# Patient Record
Sex: FEMALE | Race: WHITE | NOT HISPANIC OR LATINO | Employment: OTHER | URBAN - METROPOLITAN AREA
[De-identification: names, ages, dates, MRNs, and addresses within clinical notes are randomized per-mention and may not be internally consistent; named-entity substitution may affect disease eponyms.]

---

## 2023-02-23 LAB
ALBUMIN (G/DL) IN SER/PLAS: 4.1 G/DL (ref 3.4–5)
ANION GAP IN SER/PLAS: 10 MMOL/L (ref 10–20)
CALCIUM (MG/DL) IN SER/PLAS: 9.5 MG/DL (ref 8.6–10.3)
CARBON DIOXIDE, TOTAL (MMOL/L) IN SER/PLAS: 28 MMOL/L (ref 21–32)
CHLORIDE (MMOL/L) IN SER/PLAS: 105 MMOL/L (ref 98–107)
CHOLESTEROL (MG/DL) IN SER/PLAS: 153 MG/DL (ref 0–199)
CHOLESTEROL IN HDL (MG/DL) IN SER/PLAS: 46.1 MG/DL
CHOLESTEROL/HDL RATIO: 3.3
CREATININE (MG/DL) IN SER/PLAS: 0.83 MG/DL (ref 0.5–1.05)
GFR FEMALE: 78 ML/MIN/1.73M2
GLUCOSE (MG/DL) IN SER/PLAS: 98 MG/DL (ref 74–99)
LDL: 89 MG/DL (ref 0–99)
PHOSPHATE (MG/DL) IN SER/PLAS: 2.8 MG/DL (ref 2.5–4.9)
POTASSIUM (MMOL/L) IN SER/PLAS: 4.1 MMOL/L (ref 3.5–5.3)
SODIUM (MMOL/L) IN SER/PLAS: 139 MMOL/L (ref 136–145)
TRIGLYCERIDE (MG/DL) IN SER/PLAS: 88 MG/DL (ref 0–149)
UREA NITROGEN (MG/DL) IN SER/PLAS: 14 MG/DL (ref 6–23)
VLDL: 18 MG/DL (ref 0–40)

## 2023-09-27 PROBLEM — H34.219 HOLLENHORST PLAQUE: Status: ACTIVE | Noted: 2023-09-27

## 2023-09-27 PROBLEM — H40.9 GLAUCOMA: Status: ACTIVE | Noted: 2023-09-27

## 2023-09-27 PROBLEM — H40.243 RESIDUAL STAGE OF ANGLE-CLOSURE GLAUCOMA OF BOTH EYES: Status: ACTIVE | Noted: 2023-09-27

## 2023-09-27 PROBLEM — G43.109 MIGRAINE AURA WITHOUT HEADACHE: Status: ACTIVE | Noted: 2023-09-27

## 2023-09-27 PROBLEM — K21.9 GERD (GASTROESOPHAGEAL REFLUX DISEASE): Status: ACTIVE | Noted: 2023-09-27

## 2023-09-27 PROBLEM — G47.30 SLEEP APNEA: Status: ACTIVE | Noted: 2023-09-27

## 2023-09-27 PROBLEM — H18.519 CORNEA GUTTATA: Status: ACTIVE | Noted: 2023-09-27

## 2023-09-27 PROBLEM — H25.13 NUCLEAR SCLEROTIC CATARACT OF BOTH EYES: Status: ACTIVE | Noted: 2023-09-27

## 2023-09-27 PROBLEM — H26.9 CATARACTS, BILATERAL: Status: ACTIVE | Noted: 2023-09-27

## 2023-09-27 PROBLEM — E78.2 MIXED HYPERLIPIDEMIA: Status: ACTIVE | Noted: 2023-09-27

## 2023-09-27 PROBLEM — N62 SYMPTOMATIC MAMMARY HYPERTROPHY: Status: ACTIVE | Noted: 2023-09-27

## 2023-09-27 PROBLEM — H18.413 ARCUS SENILIS OF BOTH CORNEAS: Status: ACTIVE | Noted: 2023-09-27

## 2023-09-27 PROBLEM — H53.123 TRANSIENT VISUAL LOSS OF BOTH EYES: Status: ACTIVE | Noted: 2023-09-27

## 2023-09-27 PROBLEM — E55.9 VITAMIN D DEFICIENCY: Status: ACTIVE | Noted: 2023-09-27

## 2023-09-27 PROBLEM — D31.30 NEVUS OF CHOROID: Status: ACTIVE | Noted: 2023-09-27

## 2023-09-27 PROBLEM — G45.3 AMAUROSIS FUGAX OF LEFT EYE: Status: ACTIVE | Noted: 2023-09-27

## 2023-09-27 RX ORDER — ASPIRIN 325 MG
1 TABLET ORAL DAILY
COMMUNITY

## 2023-09-27 RX ORDER — CARBOXYMETHYLCELLULOSE SODIUM 5 MG/ML
1 SOLUTION/ DROPS OPHTHALMIC 2 TIMES DAILY PRN
COMMUNITY

## 2023-09-27 RX ORDER — MULTIVITAMIN
1 TABLET ORAL DAILY
COMMUNITY

## 2023-09-27 RX ORDER — OMEPRAZOLE 40 MG/1
1 CAPSULE, DELAYED RELEASE ORAL DAILY
COMMUNITY
Start: 2017-11-01

## 2023-10-05 DIAGNOSIS — Z12.11 COLON CANCER SCREENING: Primary | ICD-10-CM

## 2023-10-05 RX ORDER — POLYETHYLENE GLYCOL 3350, SODIUM SULFATE ANHYDROUS, SODIUM BICARBONATE, SODIUM CHLORIDE, POTASSIUM CHLORIDE 236; 22.74; 6.74; 5.86; 2.97 G/4L; G/4L; G/4L; G/4L; G/4L
4000 POWDER, FOR SOLUTION ORAL ONCE
Qty: 4000 ML | Refills: 0 | Status: SHIPPED | OUTPATIENT
Start: 2023-10-05 | End: 2023-10-06

## 2023-10-12 PROBLEM — R00.2 PALPITATIONS: Status: ACTIVE | Noted: 2023-10-12

## 2023-10-12 PROBLEM — H53.9 VISUAL DISTURBANCE: Status: ACTIVE | Noted: 2023-10-12

## 2023-10-12 PROBLEM — R06.02 SHORTNESS OF BREATH: Status: ACTIVE | Noted: 2023-10-12

## 2023-10-12 PROBLEM — R68.82 DECREASED LIBIDO: Status: ACTIVE | Noted: 2023-10-12

## 2023-10-12 PROBLEM — N95.0 POSTMENOPAUSAL BLEEDING: Status: ACTIVE | Noted: 2023-10-12

## 2023-10-12 PROBLEM — I82.90 THROMBUS: Status: ACTIVE | Noted: 2023-10-12

## 2023-10-12 PROBLEM — R53.83 FATIGUE: Status: ACTIVE | Noted: 2023-10-12

## 2023-10-12 RX ORDER — ROSUVASTATIN CALCIUM 10 MG/1
1 TABLET, COATED ORAL NIGHTLY
COMMUNITY
End: 2023-10-13 | Stop reason: WASHOUT

## 2023-10-12 RX ORDER — ERYTHROMYCIN 250 MG/1
1000 TABLET, COATED ORAL
COMMUNITY

## 2023-10-12 RX ORDER — TOLTERODINE 4 MG/1
4 CAPSULE, EXTENDED RELEASE ORAL DAILY
COMMUNITY
Start: 2022-10-31

## 2023-10-13 ENCOUNTER — LAB (OUTPATIENT)
Dept: LAB | Facility: LAB | Age: 63
End: 2023-10-13
Payer: MEDICARE

## 2023-10-13 ENCOUNTER — OFFICE VISIT (OUTPATIENT)
Dept: PRIMARY CARE | Facility: CLINIC | Age: 63
End: 2023-10-13
Payer: MEDICARE

## 2023-10-13 VITALS
HEIGHT: 67 IN | WEIGHT: 265 LBS | HEART RATE: 75 BPM | TEMPERATURE: 97.6 F | DIASTOLIC BLOOD PRESSURE: 80 MMHG | BODY MASS INDEX: 41.59 KG/M2 | SYSTOLIC BLOOD PRESSURE: 120 MMHG

## 2023-10-13 DIAGNOSIS — Z00.00 WELCOME TO MEDICARE PREVENTIVE VISIT: ICD-10-CM

## 2023-10-13 DIAGNOSIS — E78.2 MIXED HYPERLIPIDEMIA: ICD-10-CM

## 2023-10-13 DIAGNOSIS — E78.2 MIXED HYPERLIPIDEMIA: Primary | ICD-10-CM

## 2023-10-13 DIAGNOSIS — Z12.31 SCREENING MAMMOGRAM FOR BREAST CANCER: ICD-10-CM

## 2023-10-13 DIAGNOSIS — K21.9 GASTROESOPHAGEAL REFLUX DISEASE WITHOUT ESOPHAGITIS: ICD-10-CM

## 2023-10-13 DIAGNOSIS — Z13.6 SCREENING FOR CARDIOVASCULAR CONDITION: ICD-10-CM

## 2023-10-13 PROBLEM — N95.0 POSTMENOPAUSAL BLEEDING: Status: RESOLVED | Noted: 2023-10-12 | Resolved: 2023-10-13

## 2023-10-13 PROBLEM — N62 SYMPTOMATIC MAMMARY HYPERTROPHY: Status: RESOLVED | Noted: 2023-09-27 | Resolved: 2023-10-13

## 2023-10-13 PROBLEM — H53.9 VISUAL DISTURBANCE: Status: RESOLVED | Noted: 2023-10-12 | Resolved: 2023-10-13

## 2023-10-13 PROBLEM — R00.2 PALPITATIONS: Status: RESOLVED | Noted: 2023-10-12 | Resolved: 2023-10-13

## 2023-10-13 PROBLEM — I82.90 THROMBUS: Status: RESOLVED | Noted: 2023-10-12 | Resolved: 2023-10-13

## 2023-10-13 PROBLEM — R53.83 FATIGUE: Status: RESOLVED | Noted: 2023-10-12 | Resolved: 2023-10-13

## 2023-10-13 PROBLEM — R06.02 SHORTNESS OF BREATH: Status: RESOLVED | Noted: 2023-10-12 | Resolved: 2023-10-13

## 2023-10-13 PROBLEM — R68.82 DECREASED LIBIDO: Status: RESOLVED | Noted: 2023-10-12 | Resolved: 2023-10-13

## 2023-10-13 LAB
ALBUMIN SERPL BCP-MCNC: 4.2 G/DL (ref 3.4–5)
ALP SERPL-CCNC: 58 U/L (ref 33–136)
ALT SERPL W P-5'-P-CCNC: 13 U/L (ref 7–45)
ANION GAP SERPL CALC-SCNC: 13 MMOL/L (ref 10–20)
AST SERPL W P-5'-P-CCNC: 16 U/L (ref 9–39)
BILIRUB SERPL-MCNC: 0.8 MG/DL (ref 0–1.2)
BUN SERPL-MCNC: 13 MG/DL (ref 6–23)
CALCIUM SERPL-MCNC: 9.5 MG/DL (ref 8.6–10.3)
CHLORIDE SERPL-SCNC: 105 MMOL/L (ref 98–107)
CHOLEST SERPL-MCNC: 197 MG/DL (ref 0–199)
CHOLESTEROL/HDL RATIO: 3.7
CO2 SERPL-SCNC: 27 MMOL/L (ref 21–32)
CREAT SERPL-MCNC: 0.89 MG/DL (ref 0.5–1.05)
ERYTHROCYTE [DISTWIDTH] IN BLOOD BY AUTOMATED COUNT: 13.2 % (ref 11.5–14.5)
GFR SERPL CREATININE-BSD FRML MDRD: 72 ML/MIN/1.73M*2
GLUCOSE SERPL-MCNC: 98 MG/DL (ref 74–99)
HCT VFR BLD AUTO: 44.8 % (ref 36–46)
HDLC SERPL-MCNC: 53.8 MG/DL
HGB BLD-MCNC: 14.5 G/DL (ref 12–16)
LDLC SERPL CALC-MCNC: 121 MG/DL
MCH RBC QN AUTO: 29.5 PG (ref 26–34)
MCHC RBC AUTO-ENTMCNC: 32.4 G/DL (ref 32–36)
MCV RBC AUTO: 91 FL (ref 80–100)
NON HDL CHOLESTEROL: 143 MG/DL (ref 0–149)
NRBC BLD-RTO: 0 /100 WBCS (ref 0–0)
PLATELET # BLD AUTO: 277 X10*3/UL (ref 150–450)
PMV BLD AUTO: 10.1 FL (ref 7.5–11.5)
POTASSIUM SERPL-SCNC: 4.5 MMOL/L (ref 3.5–5.3)
PROT SERPL-MCNC: 7.1 G/DL (ref 6.4–8.2)
RBC # BLD AUTO: 4.91 X10*6/UL (ref 4–5.2)
SODIUM SERPL-SCNC: 140 MMOL/L (ref 136–145)
TRIGL SERPL-MCNC: 109 MG/DL (ref 0–149)
TSH SERPL-ACNC: 1.62 MIU/L (ref 0.44–3.98)
VLDL: 22 MG/DL (ref 0–40)
WBC # BLD AUTO: 5.2 X10*3/UL (ref 4.4–11.3)

## 2023-10-13 PROCEDURE — 85027 COMPLETE CBC AUTOMATED: CPT

## 2023-10-13 PROCEDURE — 1170F FXNL STATUS ASSESSED: CPT | Performed by: INTERNAL MEDICINE

## 2023-10-13 PROCEDURE — 80061 LIPID PANEL: CPT

## 2023-10-13 PROCEDURE — 1160F RVW MEDS BY RX/DR IN RCRD: CPT | Performed by: INTERNAL MEDICINE

## 2023-10-13 PROCEDURE — 84443 ASSAY THYROID STIM HORMONE: CPT

## 2023-10-13 PROCEDURE — 36415 COLL VENOUS BLD VENIPUNCTURE: CPT

## 2023-10-13 PROCEDURE — 1159F MED LIST DOCD IN RCRD: CPT | Performed by: INTERNAL MEDICINE

## 2023-10-13 PROCEDURE — G0402 INITIAL PREVENTIVE EXAM: HCPCS | Performed by: INTERNAL MEDICINE

## 2023-10-13 PROCEDURE — 93000 ELECTROCARDIOGRAM COMPLETE: CPT | Performed by: INTERNAL MEDICINE

## 2023-10-13 PROCEDURE — 1036F TOBACCO NON-USER: CPT | Performed by: INTERNAL MEDICINE

## 2023-10-13 PROCEDURE — 1125F AMNT PAIN NOTED PAIN PRSNT: CPT | Performed by: INTERNAL MEDICINE

## 2023-10-13 PROCEDURE — 80053 COMPREHEN METABOLIC PANEL: CPT

## 2023-10-13 ASSESSMENT — PATIENT HEALTH QUESTIONNAIRE - PHQ9
2. FEELING DOWN, DEPRESSED OR HOPELESS: NOT AT ALL
SUM OF ALL RESPONSES TO PHQ9 QUESTIONS 1 AND 2: 0
1. LITTLE INTEREST OR PLEASURE IN DOING THINGS: NOT AT ALL

## 2023-10-13 ASSESSMENT — ACTIVITIES OF DAILY LIVING (ADL)
BATHING: INDEPENDENT
MANAGING_FINANCES: INDEPENDENT
DRESSING: INDEPENDENT
TAKING_MEDICATION: INDEPENDENT
GROCERY_SHOPPING: INDEPENDENT
DOING_HOUSEWORK: INDEPENDENT

## 2023-10-13 ASSESSMENT — ENCOUNTER SYMPTOMS
OCCASIONAL FEELINGS OF UNSTEADINESS: 0
LOSS OF SENSATION IN FEET: 0
DEPRESSION: 0

## 2023-10-13 ASSESSMENT — VISUAL ACUITY
OS_CC: 20/25
OD_CC: 20/30

## 2023-10-13 NOTE — PROGRESS NOTES
"Subjective   Reason for Visit: Tami Faustin is an 63 y.o. female here for a Medicare Wellness visit.          Reviewed all medications by prescribing practitioner or clinical pharmacist (such as prescriptions, OTCs, herbal therapies and supplements) and documented in the medical record.    HPI  63-year-old female with a past medical history of hyperlipidemia GERD here for welcome to Medicare physical she is scheduled to have mammogram done  Patient Care Team:  Shady Workman MD as PCP - General (Internal Medicine)     Review of Systems  REVIEW OF SYSTEMS:  General:  Denies significant weight changes, fever, chills or weakness.  SKIN: Denies any rash or change in moles.  HEENT:  No vision or hearing changes. No headache. No vertigo, No tinnitus.   GI:  No loss of appetite. No change in bowel habit. No abdominal pain. No blood in stool.  GUR: No dysuria. No hematoma, No fever. No incontinence.  Respiratory:  No cough or shortness of breath.  CNS:  No memory or mood changes. No gait disturbance. No focal weakness. No tremors. No tingling or number of extremities.   ENDO: No cold intolerance. No fatigue.    Objective   Vitals:  /80 (BP Location: Left arm, Patient Position: Sitting, BP Cuff Size: Large adult)   Pulse 75   Temp 36.4 °C (97.6 °F) (Temporal)   Ht 1.708 m (5' 7.25\")   Wt 120 kg (265 lb)   BMI 41.20 kg/m²       Physical Exam  PHYSICAL EXAM LONG:  Vitals:  Per [employer].  General Appearance:  Normal-built, well-nourished  with no apparent distress.  Skin:  Normal turgor.  No rash.  Head:  Normocephalic, atraumatic.  Eyes:  Pupils are equal, round, and reactive to light and accommodation.  Extraocular movements are intact.  No pallor of conjunctivae.  Mouth has moist oral mucosa.  Pharynx appears normal.  No erythema.  Nose:  Nasal mucosa normal.  Turbinates are within normal limits.  Ears:  Bilateral auditory ear canals are normal.  Bilateral tympanic membranes are normal and " visible.  Neck:  Supple.  No JVD.  No carotid bruit.  No thyromegaly. No cervical lymphadenopathy.   Chest:  Bilaterally good air entry and bilaterally clear to auscultation.  No wheezing.  No crackles.  Heart:  Regular rate and rhythm.  S1, S2 positive.  No murmur.  Abdomen:  Soft and nontender.  Bowel sounds are positive.  No organomegaly.  Extremities:  Bilaterally no pedal pitting edema.  Bilaterally 2+ dorsalis pedis pulses.  Neuro Exam:  Cranial nerves from II to XII intact.  No facial droop.  Tongue at midline.  Facial sensation intact to light touch and pain sensation.  Motor strength 5/5 in upper and lower extremities.  Sensation is grossly intact to light touch and pain sensation.  Deep tendon reflexes are bilaterally symmetric in upper and lower extremities and within normal limits, 2+.  No cerebellar signs.  Finger-to-nose intact.        Assessment/Plan   Problem List Items Addressed This Visit       Mixed hyperlipidemia - Primary    Relevant Orders    Comprehensive Metabolic Panel    Lipid Panel    TSH with reflex to Free T4 if abnormal    GERD (gastroesophageal reflux disease)    Relevant Orders    CBC     Other Visit Diagnoses       Screening mammogram for breast cancer        Welcome to Medicare preventive visit        Relevant Orders    ECG 12 lead (Clinic Performed)    Eye exam    Vascular US abdominal aorta anuerysm AAA screening    Screening for cardiovascular condition        Relevant Orders    ECG 12 lead    Routine general medical examination at health care facility            Welcome to Medicare physical EKG reviewed within normal limit ordered aortic ultrasound waiting for flu shot she already had a pneumonia shot strongly advised to make living will eye exam is normal    Hyperemia stable advised DASH diet lifestyle modification diet exercise lose weight gave complete blood work to work I will call her with results I will see her back in 3 months

## 2023-10-16 ENCOUNTER — ANESTHESIA (OUTPATIENT)
Dept: GASTROENTEROLOGY | Facility: HOSPITAL | Age: 63
End: 2023-10-16
Payer: MEDICARE

## 2023-10-16 ENCOUNTER — ANESTHESIA EVENT (OUTPATIENT)
Dept: GASTROENTEROLOGY | Facility: HOSPITAL | Age: 63
End: 2023-10-16
Payer: MEDICARE

## 2023-10-16 ENCOUNTER — HOSPITAL ENCOUNTER (OUTPATIENT)
Dept: GASTROENTEROLOGY | Facility: HOSPITAL | Age: 63
Discharge: HOME | End: 2023-10-16
Payer: MEDICARE

## 2023-10-16 VITALS
OXYGEN SATURATION: 96 % | WEIGHT: 260.36 LBS | DIASTOLIC BLOOD PRESSURE: 79 MMHG | TEMPERATURE: 97.3 F | HEART RATE: 62 BPM | RESPIRATION RATE: 16 BRPM | SYSTOLIC BLOOD PRESSURE: 119 MMHG | HEIGHT: 67 IN | BODY MASS INDEX: 40.87 KG/M2

## 2023-10-16 DIAGNOSIS — Z12.11 ENCOUNTER FOR SCREENING FOR MALIGNANT NEOPLASM OF COLON: ICD-10-CM

## 2023-10-16 PROBLEM — E66.813 CLASS 3 SEVERE OBESITY IN ADULT: Status: ACTIVE | Noted: 2023-10-16

## 2023-10-16 PROBLEM — E66.01 CLASS 3 SEVERE OBESITY IN ADULT (MULTI): Status: ACTIVE | Noted: 2023-10-16

## 2023-10-16 PROCEDURE — 2580000001 HC RX 258 IV SOLUTIONS: Performed by: INTERNAL MEDICINE

## 2023-10-16 PROCEDURE — 7100000010 HC PHASE TWO TIME - EACH INCREMENTAL 1 MINUTE

## 2023-10-16 PROCEDURE — 3700000002 HC GENERAL ANESTHESIA TIME - EACH INCREMENTAL 1 MINUTE

## 2023-10-16 PROCEDURE — 2500000004 HC RX 250 GENERAL PHARMACY W/ HCPCS (ALT 636 FOR OP/ED): Performed by: NURSE ANESTHETIST, CERTIFIED REGISTERED

## 2023-10-16 PROCEDURE — 3700000001 HC GENERAL ANESTHESIA TIME - INITIAL BASE CHARGE

## 2023-10-16 PROCEDURE — 7100000009 HC PHASE TWO TIME - INITIAL BASE CHARGE

## 2023-10-16 PROCEDURE — 2500000001 HC RX 250 WO HCPCS SELF ADMINISTERED DRUGS (ALT 637 FOR MEDICARE OP): Performed by: INTERNAL MEDICINE

## 2023-10-16 PROCEDURE — 45385 COLONOSCOPY W/LESION REMOVAL: CPT | Performed by: INTERNAL MEDICINE

## 2023-10-16 RX ORDER — PROPOFOL 10 MG/ML
INJECTION, EMULSION INTRAVENOUS AS NEEDED
Status: DISCONTINUED | OUTPATIENT
Start: 2023-10-16 | End: 2023-10-16

## 2023-10-16 RX ORDER — DEXTROMETHORPHAN/PSEUDOEPHED 2.5-7.5/.8
DROPS ORAL AS NEEDED
Status: COMPLETED | OUTPATIENT
Start: 2023-10-16 | End: 2023-10-16

## 2023-10-16 RX ORDER — ONDANSETRON HYDROCHLORIDE 2 MG/ML
INJECTION, SOLUTION INTRAVENOUS AS NEEDED
Status: DISCONTINUED | OUTPATIENT
Start: 2023-10-16 | End: 2023-10-16

## 2023-10-16 RX ORDER — SODIUM CHLORIDE, SODIUM LACTATE, POTASSIUM CHLORIDE, CALCIUM CHLORIDE 600; 310; 30; 20 MG/100ML; MG/100ML; MG/100ML; MG/100ML
20 INJECTION, SOLUTION INTRAVENOUS CONTINUOUS
Status: DISCONTINUED | OUTPATIENT
Start: 2023-10-16 | End: 2023-10-20 | Stop reason: HOSPADM

## 2023-10-16 RX ADMIN — SIMETHICONE 40 MG: 20 EMULSION ORAL at 14:15

## 2023-10-16 RX ADMIN — PROPOFOL 200 MG: 10 INJECTION, EMULSION INTRAVENOUS at 14:08

## 2023-10-16 RX ADMIN — ONDANSETRON 4 MG: 2 INJECTION INTRAMUSCULAR; INTRAVENOUS at 14:08

## 2023-10-16 RX ADMIN — PROPOFOL 200 MG: 10 INJECTION, EMULSION INTRAVENOUS at 14:24

## 2023-10-16 RX ADMIN — PROPOFOL 200 MG: 10 INJECTION, EMULSION INTRAVENOUS at 14:17

## 2023-10-16 RX ADMIN — SODIUM CHLORIDE, POTASSIUM CHLORIDE, SODIUM LACTATE AND CALCIUM CHLORIDE 20 ML/HR: 600; 310; 30; 20 INJECTION, SOLUTION INTRAVENOUS at 12:57

## 2023-10-16 ASSESSMENT — PAIN SCALES - GENERAL
PAINLEVEL_OUTOF10: 0 - NO PAIN
PAIN_LEVEL: 0
PAINLEVEL_OUTOF10: 0 - NO PAIN
PAINLEVEL_OUTOF10: 0 - NO PAIN

## 2023-10-16 ASSESSMENT — COLUMBIA-SUICIDE SEVERITY RATING SCALE - C-SSRS
6. HAVE YOU EVER DONE ANYTHING, STARTED TO DO ANYTHING, OR PREPARED TO DO ANYTHING TO END YOUR LIFE?: NO
1. IN THE PAST MONTH, HAVE YOU WISHED YOU WERE DEAD OR WISHED YOU COULD GO TO SLEEP AND NOT WAKE UP?: NO
2. HAVE YOU ACTUALLY HAD ANY THOUGHTS OF KILLING YOURSELF?: NO

## 2023-10-16 ASSESSMENT — PAIN - FUNCTIONAL ASSESSMENT
PAIN_FUNCTIONAL_ASSESSMENT: 0-10

## 2023-10-16 NOTE — ADDENDUM NOTE
Addendum  created 10/16/23 1458 by Himanshu Murdock MD    Review and Sign - Ready for Procedure

## 2023-10-16 NOTE — NURSING NOTE
Huddle and Timeout completed together with team. Patient wristband and GWYN information verified.

## 2023-10-16 NOTE — PRE-SEDATION DOCUMENTATION
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    Corneal guttata    GERD (gastroesophageal reflux disease)     Migraine headache     Seasonal allergies     Unspecified lump in unspecified breast     Breast nodule    Wears glasses        Principle problems:  Patient Active Problem List    Diagnosis Date Noted    Class 3 severe obesity in adult (CMS/[facility]) 10/16/2023    Medicare annual wellness visit, initial 10/13/2023    Screening mammogram for breast cancer 10/13/2023    Welcome to Medicare preventive visit 10/13/2023    Screening for cardiovascular condition 10/13/2023    Amaurosis fugax of left eye 09/27/2023    Cataracts, bilateral 09/27/2023    Arcus senilis of both corneas 09/27/2023    Glaucoma 09/27/2023    Hollenhorst plaque 09/27/2023    Migraine aura without headache 09/27/2023    Mixed hyperlipidemia 09/27/2023    Nevus of choroid 09/27/2023    Nuclear sclerotic cataract of both eyes 09/27/2023    Sleep apnea 09/27/2023    Transient visual loss of both eyes 09/27/2023    Vitamin D deficiency 09/27/2023    Cornea guttata 09/27/2023    GERD (gastroesophageal reflux disease) 09/27/2023    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      carboxymethylcellulose (Refresh Plus) 0.5 % ophthalmic solution Administer 1 drop  into both eyes 2 times a day as needed for dry eyes.      erythromycin base (E-Mycin) 250 mg tablet Take 4 tablets (1,000 mg) by mouth.  TAKE 4 TABLETS 1 HOUR PRIOR TO APPOINTMENT      multivitamin (Daily Multi-Vitamin) tablet Take 1 tablet by mouth once daily.      omeprazole (PriLOSEC) 40 mg DR capsule Take 1 tablet by mouth once daily.      peg 400-propylene glycol (SYSTANE) 0.4-0.3 % drops ophthalmic drops Administer 2 drops into both eyes 2 times a day as needed.      tolterodine LA (Detrol LA) 4 mg 24 hr capsule Take 1 capsule (4 mg) by mouth once daily.       No current facility-administered medications for this visit.     Past Surgical History:   has a past surgical history that includes Total knee arthroplasty (Bilateral); Colonoscopy; Breast lumpectomy; Plantar fascia release (Right); [place] tooth extraction; and Glaucoma surgery.    Recent sedation/surgery (24 hours) No    Review of Systems:  Please check all that apply: No significant medical history    Pregnancy test completed prior to procedure on any menstruating female: none        NPO guidelines met: Yes    Physical Exam    Airway  Mallampati: II     Cardiovascular   Rhythm: regular  Rate: normal     Dental    Pulmonary - normal exam  Breath sounds clear to auscultation         Plan    ASA 3     Moderate

## 2023-10-16 NOTE — ANESTHESIA POSTPROCEDURE EVALUATION
Patient: Tami Faustin    Procedure Summary       Date: 10/16/23 Room / Location: [facility]    Anesthesia Start: 1359 Anesthesia Stop: 1429    Procedure: COLONOSCOPY Diagnosis: Encounter for screening for malignant neoplasm of colon    Scheduled Providers: Martina Monet MD; Himanshu Murdock MD Responsible Provider: Himanshu Murdock MD    Anesthesia Type: MAC ASA Status: 3            Anesthesia Type: MAC    Vitals Value Taken Time   BP 98/61 10/16/23 1430   Temp 36.4 10/16/23 1430   Pulse 68 10/16/23 1430   Resp 18 10/16/23 1430   SpO2 99 10/16/23 1430       Anesthesia Post Evaluation    Patient location during evaluation: PACU  Patient participation: complete - patient participated  Level of consciousness: awake  Pain score: 0  Pain management: adequate  Airway patency: patent  Cardiovascular status: acceptable and stable  Respiratory status: acceptable and room air  Hydration status: stable      No notable events documented.

## 2023-10-16 NOTE — DISCHARGE INSTRUCTIONS
Repeat screening colonoscopy in 10 years   OTC anti hemorrhoid suppository 2 times daily     Patient Instructions after a Colonoscopy      The anesthetics, sedatives or narcotics which were given to you today will be acting in your body for the next 24 hours, so you might feel a little sleepy or groggy.  This feeling should slowly wear off. Carefully read and follow the instructions.     You received sedation today:  - Do not drive or operate any machinery or power tools of any kind.   - No alcoholic beverages today, not even beer or wine.  - Do not make any important decisions or sign any legal documents.  - No over the counter medications that contain alcohol or that may cause drowsiness.  - Do not make any important decisions or sign any legal documents.    While it is common to experience mild to moderate abdominal distention, gas, or belching after your procedure, if any of these symptoms occur following discharge from the GI Lab or within one week of having your procedure, call the [facility] to be advised whether a visit to your nearest Urgent Care or Emergency Department is indicated.  Take this paper with you if you go.     - If you develop an allergic reaction to the medications that were given during your procedure such as difficulty breathing, rash, hives, severe nausea, vomiting or lightheadedness.  - If you experience chest pain, shortness of breath, severe abdominal pain, fevers and chills.  -If you develop signs and symptoms of bleeding such as blood in your spit, if your stools turn black, tarry, or bloody  - If you have not urinated within 8 hours following your procedure.  - If your IV site becomes painful, red, inflamed, or looks infected.    If you received a biopsy/polypectomy/sphincterotomy the following instructions apply below:    __ Do not use Aspirin containing products, non-steroidal medications or anti-coagulants for one week following your procedure. (Examples of these  types of medications are: Advil, Arthrotec, Aleve, Coumadin, Ecotrin, Heparin, Ibuprofen, Indocin, Motrin, Naprosyn, Nuprin, Plavix, Vioxx, and Voltarin, or their generic forms.  This list is not all-inclusive.  Check with your physician or pharmacist before resuming medications.)   __ Eat a soft diet today.  Avoid foods that are poorly digested for the next 24 hours.  These foods would include: nuts, beans, lettuce, red meats, and fried foods. Start with liquids and advance your diet as tolerated, gradually work up to eating solids.   __ Do not have a Barium Study or Enema for one week.    Your physician recommends the additional following instructions:    -You have a contact number available for emergencies. The signs and symptoms of potential delayed complications were discussed with you. You may return to normal activities tomorrow.  -Resume your previous diet.  -Continue your present medications.   -We are waiting for your pathology results.  -Your physician has recommended a repeat colonoscopy (date to be determined after pending pathology results are reviewed) for surveillance based on pathology results.  -The findings and recommendations have been discussed with you.  -The findings and recommendations were discussed with your family.  - Please see Medication Reconciliation Form for new medication/medications prescribed.       If you experience any problems or have any questions following discharge from the GI Lab, please call:  Before 5p.m.  [phone]  After 5p.m.    [phone]    Nurse Signature                                                                        Date___________________                                                                            Patient/Responsible Party Signature                                        Date___________________

## 2023-10-16 NOTE — NURSING NOTE
Patient tolerated procedure well. Appears comfortable with no complaints of pain. VS stable. Arousable prior to transport. Patient transported to [facility] via cart.  Report called per CRNA.  Handoff completed.

## 2023-10-16 NOTE — ANESTHESIA PREPROCEDURE EVALUATION
Tami Faustin is a 63 y.o. female here for:    Colonoscopy  With Martina Monet MD  Encounter for screening for malignant neoplasm of colon    Relevant Problems   Anesthesia (within normal limits)      Cardiovascular   (+) Mixed hyperlipidemia      Endocrine   (+) Class 3 severe obesity in adult (CMS/HCC)      GI   (+) GERD (gastroesophageal reflux disease)      /Renal (within normal limits)      Neuro/Psych (within normal limits)      Pulmonary (within normal limits)      Eyes, Ears, Nose, and Throat   (+) Glaucoma   (+) 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       Lab Results   Component Value Date    HGB 14.5 10/13/2023    HCT 44.8 10/13/2023    WBC 5.2 10/13/2023     10/13/2023     10/13/2023    K 4.5 10/13/2023     10/13/2023    CREATININE 0.89 10/13/2023    BUN 13 10/13/2023     EKG 10/2023:  IMPRESSION:  Twelve-lead EKG , normal sinus rhythm no acute changes noted    NM Stress 2022:  IMPRESSION:  Normal Lexiscan Myoview cardiac perfusion stress test.  No evidence of ischemia or myocardial infarction by perfusion imaging.  Normal left ventricular systolic function, ejection fraction 72%.  Abnormal resting electrocardiogram is noted.  No comparison study was available.  Clinical correlation is advised.    Social History     Tobacco Use   Smoking Status Never   Smokeless Tobacco Never       Allergies   Allergen Reactions    Atorvastatin Drowsiness    Bee Pollen Headache    Bee Venom Protein (Honey Bee) Swelling    Penicillins Hives       Current Outpatient Medications   Medication Instructions    aspirin 325 mg tablet 1 tablet, oral, Daily    carboxymethylcellulose (Refresh Plus) 0.5 % ophthalmic solution 1 drop, Both Eyes, 2 times daily PRN    erythromycin base (E-MYCIN) 1,000 mg, oral,  TAKE 4 TABLETS 1 HOUR PRIOR TO APPOINTMENT    multivitamin (Daily Multi-Vitamin) tablet 1 tablet, oral, Daily    omeprazole (PriLOSEC) 40 mg DR capsule 1 tablet, oral, Daily    peg  400-propylene glycol (SYSTANE) 0.4-0.3 % drops ophthalmic drops 2 drops, Both Eyes, 2 times daily PRN    tolterodine LA (DETROL LA) 4 mg, oral, Daily       Past Surgical History:   Procedure Laterality Date    BREAST LUMPECTOMY      COLONOSCOPY      GLAUCOMA SURGERY      PLANTAR FASCIA RELEASE Right     TOTAL KNEE ARTHROPLASTY Bilateral     WISDOM TOOTH EXTRACTION         Family History   Problem Relation Name Age of Onset    Hypertension Mother      Diabetes Mother      COPD Mother      Heart failure Mother      Hypertension Father      COPD Father      Emphysema Father      Cataracts Father      Diabetes Maternal Grandmother      Macular degeneration Maternal Grandmother         NPO Details:  No data recorded    Physical Exam    Anesthesia Plan    ASA 3     MAC     intravenous induction   Anesthetic plan and risks discussed with patient.    Plan discussed with CRNA.

## 2023-10-24 ENCOUNTER — TELEPHONE (OUTPATIENT)
Dept: RHEUMATOLOGY | Facility: CLINIC | Age: 63
End: 2023-10-24
Payer: MEDICARE

## 2023-10-24 NOTE — TELEPHONE ENCOUNTER
Pt called.    She would like to re-establish with you.  Per pt - her arthritic joints aching big time going up down stairs  Since last seeing you she has had partial L knee replacement -  R full knee surgery 2019    Would you accept her again?    I did advise scheduling in march 2024.    Pt [phone]

## 2023-11-09 ENCOUNTER — OFFICE VISIT (OUTPATIENT)
Dept: CARDIOLOGY | Facility: CLINIC | Age: 63
End: 2023-11-09
Payer: MEDICARE

## 2023-11-09 VITALS
HEART RATE: 68 BPM | WEIGHT: 268.5 LBS | HEIGHT: 69 IN | BODY MASS INDEX: 39.77 KG/M2 | SYSTOLIC BLOOD PRESSURE: 118 MMHG | DIASTOLIC BLOOD PRESSURE: 82 MMHG

## 2023-11-09 DIAGNOSIS — E78.2 MIXED HYPERLIPIDEMIA: ICD-10-CM

## 2023-11-09 DIAGNOSIS — Z13.6 SCREENING FOR CARDIOVASCULAR CONDITION: ICD-10-CM

## 2023-11-09 DIAGNOSIS — Z78.9 NEVER SMOKED CIGARETTES: ICD-10-CM

## 2023-11-09 PROCEDURE — 1036F TOBACCO NON-USER: CPT | Performed by: INTERNAL MEDICINE

## 2023-11-09 PROCEDURE — 1160F RVW MEDS BY RX/DR IN RCRD: CPT | Performed by: INTERNAL MEDICINE

## 2023-11-09 PROCEDURE — 3008F BODY MASS INDEX DOCD: CPT | Performed by: INTERNAL MEDICINE

## 2023-11-09 PROCEDURE — 99214 OFFICE O/P EST MOD 30 MIN: CPT | Performed by: INTERNAL MEDICINE

## 2023-11-09 PROCEDURE — 1159F MED LIST DOCD IN RCRD: CPT | Performed by: INTERNAL MEDICINE

## 2023-11-09 PROCEDURE — 1125F AMNT PAIN NOTED PAIN PRSNT: CPT | Performed by: INTERNAL MEDICINE

## 2023-11-09 NOTE — PATIENT INSTRUCTIONS
Continue same medications/treatment.  Patient educated on proper medication use.  Patient educated on risk factor modification.  Please bring any lab results from other providers/physicians to your next appointment.    Please bring all medicines, vitamins, and herbal supplements with you when you come to the office.    Prescriptions will not be filled unless you are compliant with your follow up appointments or have a follow up appointment scheduled as per instruction of your physician. Refills should be requested at the time of your visit.    Follow up in 1 year     I, KATHIE BOB RN, AM SCRIBING FOR AND IN THE PRESENCE OF DR. REMY SAUCEDO MD, FACC

## 2023-11-09 NOTE — PROGRESS NOTES
Referred by Dr. Connell ref. provider found provider found for   Chief Complaint   Patient presents with    Follow-up     9 month        History of Present Illness  Tami Faustin is a 63 y.o. year old female patient doing well from a cardiac standpoint no complaint no symptoms of chest pain or shortness of breath.  She denies any symptoms of syncope or presyncope.  She does have symptoms of fatigue and tiredness.  She was tested for sleep apnea last year but had mild degree.  I discussed with the patient at length we will continue medication her LDL is 121 off statins since she could not tolerate it.  She stated that she has not been compliant with her diet and exercise.  I will repeat cholesterol level in 3 months.  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    Corneal guttata    GERD (gastroesophageal reflux disease)     Migraine headache     Seasonal allergies     Unspecified lump in unspecified breast     Breast nodule    Wears glasses        Social History  Social History     Tobacco Use    Smoking status: Never    Smokeless tobacco: Never   Vaping Use    Vaping Use: Never used   Substance Use Topics    Alcohol use: Yes     Comment: occasionally, 1x weekly    Drug use: Never       Family History     Family History   Problem Relation Name Age of Onset    Hypertension Mother      Diabetes Mother      COPD Mother      Heart failure Mother      Hypertension Father      COPD Father      Emphysema Father      Cataracts Father      Diabetes Maternal Grandmother      Macular degeneration Maternal Grandmother         Review of Systems  As per HPI, all other systems reviewed and negative.    Allergies:  Allergies   Allergen Reactions    Atorvastatin Drowsiness    Bee Pollen Headache    Bee Venom Protein (Honey Bee) Swelling    Penicillins Hives         Outpatient Medications:  Current Outpatient Medications   Medication Instructions    aspirin 325 mg tablet 1 tablet, oral, Daily    carboxymethylcellulose (Refresh Plus) 0.5 % ophthalmic solution 1 drop, Both Eyes, 2 times daily PRN    erythromycin base (E-MYCIN) 1,000 mg, oral,  TAKE 4 TABLETS 1 HOUR PRIOR TO APPOINTMENT    multivitamin (Daily Multi-Vitamin) tablet 1 tablet, oral, Daily    omeprazole (PriLOSEC) 40 mg DR capsule 1 tablet, oral, Daily    tolterodine LA (DETROL LA) 4 mg, oral, Daily, PRN         Vitals:  Vitals:    11/09/23 0912   BP: 118/82   Pulse: 68       Physical Exam:    Constitutional:       Appearance: Healthy appearance. Not in distress.   Neck:      Vascular: No JVR. JVD normal.   Pulmonary:      Effort: Pulmonary effort is normal.      Breath sounds: Normal breath sounds. No wheezing. No rhonchi. No rales.   Chest:      Chest wall: Not tender to palpatation.   Cardiovascular:      PMI at left midclavicular line. Normal rate. Regular rhythm. Normal S1. Normal S2.       Murmurs: There is no murmur.      No gallop.  No click. No rub.   Pulses:     Intact distal pulses.   Edema:     Peripheral edema absent.   Abdominal:      General: Bowel sounds are normal.      Palpations: Abdomen is soft.      Tenderness: There is no abdominal tenderness.   Musculoskeletal: Normal range of motion.         General: No tenderness. Skin:     General: Skin is warm and dry.   Neurological:      General: No focal deficit present.      Mental Status: Alert and oriented to person, place and time.             Assessment/Plan           Steffanie Trevizo MD [facility]  Interventional Cardiology   of [facility]     Thank you for allowing me to participate in the care of this patient. Please do not hesitate to contact me with any further questions or concerns.

## 2023-11-14 ENCOUNTER — TELEPHONE (OUTPATIENT)
Dept: PRIMARY CARE | Facility: CLINIC | Age: 63
End: 2023-11-14
Payer: MEDICARE

## 2023-11-14 DIAGNOSIS — M81.0 OSTEOPOROSIS, UNSPECIFIED OSTEOPOROSIS TYPE, UNSPECIFIED PATHOLOGICAL FRACTURE PRESENCE: ICD-10-CM

## 2023-11-15 ENCOUNTER — HOSPITAL ENCOUNTER (OUTPATIENT)
Dept: RADIOLOGY | Facility: HOSPITAL | Age: 63
Discharge: HOME | End: 2023-11-15
Payer: MEDICARE

## 2023-11-15 DIAGNOSIS — Z12.39 ENCOUNTER FOR OTHER SCREENING FOR MALIGNANT NEOPLASM OF BREAST: ICD-10-CM

## 2023-11-15 PROCEDURE — 77067 SCR MAMMO BI INCL CAD: CPT | Mod: BILATERAL PROCEDURE | Performed by: RADIOLOGY

## 2023-11-15 PROCEDURE — 77067 SCR MAMMO BI INCL CAD: CPT

## 2023-11-15 PROCEDURE — 77063 BREAST TOMOSYNTHESIS BI: CPT | Mod: BILATERAL PROCEDURE | Performed by: RADIOLOGY

## 2023-11-21 ENCOUNTER — ANCILLARY PROCEDURE (OUTPATIENT)
Dept: RADIOLOGY | Facility: CLINIC | Age: 63
End: 2023-11-21
Payer: MEDICARE

## 2023-11-21 DIAGNOSIS — M81.0 OSTEOPOROSIS, UNSPECIFIED OSTEOPOROSIS TYPE, UNSPECIFIED PATHOLOGICAL FRACTURE PRESENCE: ICD-10-CM

## 2023-11-21 PROCEDURE — 77080 DXA BONE DENSITY AXIAL: CPT | Performed by: RADIOLOGY

## 2023-11-21 PROCEDURE — 77080 DXA BONE DENSITY AXIAL: CPT

## 2023-11-28 ENCOUNTER — TELEPHONE (OUTPATIENT)
Dept: PRIMARY CARE | Facility: CLINIC | Age: 63
End: 2023-11-28
Payer: MEDICARE

## 2023-11-28 NOTE — TELEPHONE ENCOUNTER
The patient called the office  and was wanting to know why she had a oreder for an ultra sound for AAA. Is this just a preventive test? Please advise

## 2023-11-29 NOTE — TELEPHONE ENCOUNTER
Attempted patient, phone call got disconnected.   Tried calling back and left her a message to return our call.

## 2023-11-30 NOTE — TELEPHONE ENCOUNTER
Patient came into the office in regards to the order. She was advised that it was part of the Welcome to medicare and was advised that if she had any other questions or concerns in regards to the test to schedule a follow-up appointment with Dr. Workman. Patient verbalized understanding.

## 2024-02-01 ENCOUNTER — TELEPHONE (OUTPATIENT)
Dept: PRIMARY CARE | Facility: CLINIC | Age: 64
End: 2024-02-01
Payer: MEDICARE

## 2024-02-01 NOTE — TELEPHONE ENCOUNTER
Mayte from [facility] called office and requested a copy of the patients EKG that was done on 10/13/2023 be faxed to her at [phone]    EKG faxed

## 2024-03-06 PROBLEM — Z12.31 SCREENING MAMMOGRAM FOR BREAST CANCER: Status: RESOLVED | Noted: 2023-10-13 | Resolved: 2024-03-06

## 2024-03-06 PROBLEM — Z00.00 WELCOME TO MEDICARE PREVENTIVE VISIT: Status: RESOLVED | Noted: 2023-10-13 | Resolved: 2024-03-06

## 2024-03-06 PROBLEM — Z13.6 SCREENING FOR CARDIOVASCULAR CONDITION: Status: RESOLVED | Noted: 2023-10-13 | Resolved: 2024-03-06

## 2024-03-06 PROBLEM — Z78.9 NEVER SMOKED CIGARETTES: Status: RESOLVED | Noted: 2023-11-09 | Resolved: 2024-03-06

## 2024-03-06 PROBLEM — Z00.00 MEDICARE ANNUAL WELLNESS VISIT, INITIAL: Status: RESOLVED | Noted: 2023-10-13 | Resolved: 2024-03-06

## 2024-03-13 ENCOUNTER — OFFICE VISIT (OUTPATIENT)
Dept: RHEUMATOLOGY | Facility: CLINIC | Age: 64
End: 2024-03-13
Payer: MEDICARE

## 2024-03-13 VITALS
TEMPERATURE: 97.2 F | SYSTOLIC BLOOD PRESSURE: 116 MMHG | DIASTOLIC BLOOD PRESSURE: 86 MMHG | BODY MASS INDEX: 40.16 KG/M2 | WEIGHT: 265 LBS | HEIGHT: 68 IN | HEART RATE: 64 BPM | OXYGEN SATURATION: 98 %

## 2024-03-13 DIAGNOSIS — M15.9 GENERALIZED OSTEOARTHRITIS OF MULTIPLE SITES: Primary | ICD-10-CM

## 2024-03-13 DIAGNOSIS — E66.01 CLASS 3 SEVERE OBESITY DUE TO EXCESS CALORIES WITH SERIOUS COMORBIDITY AND BODY MASS INDEX (BMI) OF 40.0 TO 44.9 IN ADULT (MULTI): ICD-10-CM

## 2024-03-13 PROBLEM — E66.813 CLASS 3 SEVERE OBESITY IN ADULT: Status: RESOLVED | Noted: 2023-10-16 | Resolved: 2024-03-13

## 2024-03-13 PROBLEM — E66.813 CLASS 3 SEVERE OBESITY DUE TO EXCESS CALORIES WITH SERIOUS COMORBIDITY AND BODY MASS INDEX (BMI) OF 40.0 TO 44.9 IN ADULT: Status: ACTIVE | Noted: 2024-03-13

## 2024-03-13 PROCEDURE — 3008F BODY MASS INDEX DOCD: CPT | Performed by: INTERNAL MEDICINE

## 2024-03-13 PROCEDURE — 1036F TOBACCO NON-USER: CPT | Performed by: INTERNAL MEDICINE

## 2024-03-13 PROCEDURE — 1160F RVW MEDS BY RX/DR IN RCRD: CPT | Performed by: INTERNAL MEDICINE

## 2024-03-13 PROCEDURE — 1159F MED LIST DOCD IN RCRD: CPT | Performed by: INTERNAL MEDICINE

## 2024-03-13 PROCEDURE — 99204 OFFICE O/P NEW MOD 45 MIN: CPT | Performed by: INTERNAL MEDICINE

## 2024-03-13 RX ORDER — NABUMETONE 750 MG/1
750 TABLET, FILM COATED ORAL 2 TIMES DAILY PRN
Qty: 60 TABLET | Refills: 5 | Status: SHIPPED | OUTPATIENT
Start: 2024-03-13 | End: 2024-11-08

## 2024-03-13 ASSESSMENT — ENCOUNTER SYMPTOMS
CONSTIPATION: 0
FREQUENCY: 0
HEMATURIA: 0
POLYDIPSIA: 0
HEADACHES: 0
CONFUSION: 0
NAUSEA: 0
COUGH: 0
EYE DISCHARGE: 0
DYSURIA: 0
ARTHRALGIAS: 1
ROS GI COMMENTS: HEARTBURN
EYE PAIN: 0
ABDOMINAL PAIN: 0
NUMBNESS: 1
DIARRHEA: 0
BRUISES/BLEEDS EASILY: 0
DIFFICULTY URINATING: 0
EYE REDNESS: 0
UNEXPECTED WEIGHT CHANGE: 0
MYALGIAS: 1
SORE THROAT: 0
BACK PAIN: 1
SHORTNESS OF BREATH: 0
NERVOUS/ANXIOUS: 0
TROUBLE SWALLOWING: 0
WEAKNESS: 1
FATIGUE: 1
WHEEZING: 0
DIZZINESS: 0
PALPITATIONS: 0
DECREASED CONCENTRATION: 0
VOMITING: 0
JOINT SWELLING: 1
EYE ITCHING: 1
FEVER: 0
SLEEP DISTURBANCE: 1
PHOTOPHOBIA: 0
COLOR CHANGE: 0

## 2024-03-13 ASSESSMENT — PATIENT HEALTH QUESTIONNAIRE - PHQ9
SUM OF ALL RESPONSES TO PHQ9 QUESTIONS 1 & 2: 0
2. FEELING DOWN, DEPRESSED OR HOPELESS: NOT AT ALL
1. LITTLE INTEREST OR PLEASURE IN DOING THINGS: NOT AT ALL

## 2024-03-13 NOTE — PATIENT INSTRUCTIONS
It was a pleasure to see you today  Please call if your symptoms worsen  Please review your summary for education and reminders.  Follow up at your next appointment.    If you had labs/xrays done today, you will be able to view on [employer].   We will contact you when the results are reviewed for further discussion.  Please note that you may receive your results before I have had a chance to review.  Please know I will be contacting you for discussion  Homegoing instructions for all patient  A healthy lifestyle helps chronic diseases  These are all the goals you should strive to improve your overall health   Blood pressure <130/85   BMI of <30 or waist circumference that is 1/2 of your height   Fasting blood sugar <107 (if you are diabetic, aim for an A1c <6.4%_   LDL cholesterol <130   Avoid smoking   Manage your stress   Get your preventive exams   Get your immunizations        Ways to Help Prevent Falls at Home    Quick Tips   ? Ask for help if you need it. Most people want to help!   ? Get up slowly after sitting or laying down   ? Wear a medical alert device or keep cell phone in your pocket   ? Use night lights, especially areas near a bathroom   ? Keep the items you use often within reach on a small stool or end table   ? Use an assistive device such as walker or cane, as directed by provider/physical therapy   ? Use a non-slip mat and grab bars in your bathroom. Look for home health sections for best options     Other Areas to Focus On   ? Exercise and nutrition: Regular exercise or taking a falls prevention class are great ways improve strength and balance. Don’t forget to stay hydrated and bring a snack!   ? Medicine side effects: Some medicines can make you sleepy or dizzy, which could cause a fall. Ask your healthcare provider about the side effects your medicines could cause. Be sure to let them know if you take any vitamins or supplements as well.   ? Tripping hazards: Remove items you could trip on,  such as loose mats, rugs, cords, and clutter. Wear closed toe shoes with rubber soles.   ? Health and wellness: Get regular checkups with your healthcare provider, plus routine vision and hearing screenings. Talk with your healthcare provider about:   o Your medicines and the possible side effects - bring them in a bag if that is easier!   o Problems with balance or feeling dizzy   o Ways to promote bone health, such as Vitamin D and calcium supplements   o Questions or concerns about falling     *Ask your healthcare team if you have questions     ©[facility], 2022

## 2024-03-13 NOTE — PROGRESS NOTES
RHEUMATOLOGY CONSULTATION NOTE  PCP:  AURA Faustin 63 y.o. female  Chief Complaint   Patient presents with    [facility]    Osteoarthritis     See scanned history form   SUBJECTIVE  Pt has a long standing history of arthritis and over the years has had B TKR and multiple foot surgeries--most recently a repair of achilles/calf on 2/16/24.    Pt used to be on vioxx over 20 years ago with good relief but hasn't been on any agent since.   Pt made appointment to see if there was anything she could be on to help her worsening achiness.  Pt wakes up stiff in hands, arms, back   Not a lot of swelling.         Patient Active Problem List    Diagnosis Date Noted    Generalized osteoarthritis of multiple sites 03/13/2024    Class 3 severe obesity due to excess calories with serious comorbidity and body mass index (BMI) of 40.0 to 44.9 in adult (CMS/[facility]) 03/13/2024    Amaurosis fugax of left eye 09/27/2023    Arcus senilis of both corneas 09/27/2023    Hollenhorst plaque 09/27/2023    Migraine aura without headache 09/27/2023    Mixed hyperlipidemia 09/27/2023    Nevus of choroid 09/27/2023    Nuclear sclerotic cataract of both eyes 09/27/2023    Sleep apnea 09/27/2023    Transient visual loss of both eyes 09/27/2023    Vitamin D deficiency 09/27/2023    Cornea guttata 09/27/2023    GERD (gastroesophageal reflux disease) 09/27/2023    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 09/27/2023     Past Medical History:   Diagnosis Date    Arthritis     Calcaneal spur, unspecified foot     Heel spur    Consumes two servings of caffeine per day     Endothelial corneal dystrophy, unspecified eye 05/07/2018    Corneal guttata    GERD (gastroesophageal reflux disease)     Migraine headache     Seasonal allergies     Spinal stenosis, lumbar region, without neurogenic claudication 11/28/2006    Unspecified lump in unspecified breast     Breast nodule    Wears glasses      Past Surgical History:   Procedure  Laterality Date    ACHILLES TENDON SURGERY Left 2014    ACHILLES TENDON SURGERY Left 02/16/2024    BREAST BIOPSY Right 1992    benign x2    COLONOSCOPY      GLAUCOMA SURGERY      PLANTAR FASCIA RELEASE Right 1999    TOTAL KNEE ARTHROPLASTY Bilateral     2011 and 2019    WISDOM TOOTH EXTRACTION         Current Outpatient Medications   Medication Instructions    aspirin 325 mg tablet 1 tablet, oral, Daily    carboxymethylcellulose (Refresh Plus) 0.5 % ophthalmic solution 1 drop, Both Eyes, 2 times daily PRN    erythromycin base (E-MYCIN) 1,000 mg, oral,  TAKE 4 TABLETS 1 HOUR PRIOR TO APPOINTMENT    multivitamin (Daily Multi-Vitamin) tablet 1 tablet, oral, Daily    nabumetone (RELAFEN) 750 mg, oral, 2 times daily PRN    omeprazole (PriLOSEC) 40 mg DR capsule 1 tablet, oral, Daily    tolterodine LA (DETROL LA) 4 mg, oral, Daily, PRN     Allergies   Allergen Reactions    Atorvastatin Drowsiness    Bee Pollen Headache    Bee Venom Protein (Honey Bee) Swelling    Penicillins Hives     Review of Systems   Constitutional:  Positive for fatigue. Negative for fever and unexpected weight change.   HENT:  Positive for tinnitus. Negative for congestion, hearing loss, mouth sores, nosebleeds, sneezing, sore throat and trouble swallowing.         Dry mouth   Eyes:  Positive for itching. Negative for photophobia, pain, discharge, redness and visual disturbance.        +dry eye   Respiratory:  Negative for cough, shortness of breath and wheezing.    Cardiovascular:  Negative for chest pain, palpitations and leg swelling.   Gastrointestinal:  Negative for abdominal pain, constipation, diarrhea, nausea and vomiting.        Heartburn   Endocrine: Negative for polydipsia and polyuria.   Genitourinary:  Negative for difficulty urinating, dysuria, frequency and hematuria.   Musculoskeletal:  Positive for arthralgias, back pain, gait problem, joint swelling and myalgias.   Skin:  Negative for color change and rash.   Allergic/Immunologic:  "Negative for immunocompromised state.   Neurological:  Positive for weakness and numbness. Negative for dizziness and headaches.   Hematological:  Does not bruise/bleed easily.   Psychiatric/Behavioral:  Positive for sleep disturbance. Negative for confusion and decreased concentration. The patient is not nervous/anxious.        PHYSICAL EXAM  /86   Pulse 64   Temp 36.2 °C (97.2 °F) (Temporal)   Ht 1.727 m (5' 8\")   Wt 120 kg (265 lb)   SpO2 98%   BMI 40.29 kg/m²   Physical Exam  Vitals reviewed.   Constitutional:       General: She is not in acute distress.     Appearance: Normal appearance.   HENT:      Head: Normocephalic and atraumatic.   Eyes:      Conjunctiva/sclera: Conjunctivae normal.      Pupils: Pupils are equal, round, and reactive to light.   Cardiovascular:      Pulses: Normal pulses.   Pulmonary:      Effort: Pulmonary effort is normal. No respiratory distress.   Musculoskeletal:         General: Swelling present. No tenderness or deformity.      Cervical back: Normal range of motion.      Right lower leg: No edema.      Left lower leg: No edema.      Comments: Kyphosis and scoliosis noted  +paraspinal muscle tightness  +swelling CMC  No synovitis PIP, MCP, DIP, wrists, elbows, shoulders  S/p B TKR  L foot/ankle in cast  In wheelchair   Skin:     Coloration: Skin is not pale.      Findings: No erythema or rash.   Neurological:      General: No focal deficit present.      Mental Status: She is alert and oriented to person, place, and time. Mental status is at baseline.      Sensory: No sensory deficit.   Psychiatric:         Mood and Affect: Mood normal.         Assessment/plan  Problem List Items Addressed This Visit       Generalized osteoarthritis of multiple sites - Primary    Current Assessment & Plan     OA of multiple joints.  Will try NSAID to see if that provides consistent relief of symptoms.  Pt can take med as needed.   Pt to call if not better in the next several weeks         " Relevant Medications    nabumetone (Relafen) 750 mg tablet    Class 3 severe obesity due to excess calories with serious comorbidity and body mass index (BMI) of 40.0 to 44.9 in adult (CMS/[facility])     Follow up: ____4_months    The patient's labs, radiology images and reports and other tests done prior to appointment were obtained, reviewed and summarized as applicable from the physician portal, EHR symptoms and/or outside sources.  Pertinent positive and negative findings were considered in medical decision making.  Old records were reviewed and further were requested from previous physicians  All questions were answered and patient was counseled regarding diagnosis, prognosis, risks and benefits of various treatment options and importance of compliance with therapy      Patient was identified as a fall risk. Risk prevention instructions provided.

## 2024-03-13 NOTE — ASSESSMENT & PLAN NOTE
OA of multiple joints.  Will try NSAID to see if that provides consistent relief of symptoms.  Pt can take med as needed.   Pt to call if not better in the next several weeks

## 2024-03-13 NOTE — LETTER
March 13, 2024     Juanita Cortez MD  [address] [address]    Patient: Tami Faustin   YOB: 1960   Date of Visit: 3/13/2024       Dear Dr. Juanita Cortez MD:    Thank you for referring Tami Faustin to me for evaluation. Below are my notes for this consultation.  If you have questions, please do not hesitate to call me. I look forward to following your patient along with you.       Sincerely,     Sima Reina MD      CC: No Recipients  ______________________________________________________________________________________    RHEUMATOLOGY CONSULTATION NOTE  PCP:  AURA Cortez  Tami Faustin 63 y.o. female  Chief Complaint   Patient presents with   • Establish Care   • Osteoarthritis     See scanned history form   SUBJECTIVE  Pt has a long standing history of arthritis and over the years has had B TKR and multiple foot surgeries--most recently a repair of achilles/calf on 2/16/24.    Pt used to be on vioxx over 20 years ago with good relief but hasn't been on any agent since.   Pt made appointment to see if there was anything she could be on to help her worsening achiness.  Pt wakes up stiff in hands, arms, back   Not a lot of swelling.         Patient Active Problem List    Diagnosis Date Noted   • Generalized osteoarthritis of multiple sites 03/13/2024   • Class 3 severe obesity due to excess calories with serious comorbidity and body mass index (BMI) of 40.0 to 44.9 in adult (CMS/[facility]) 03/13/2024   • Amaurosis fugax of left eye 09/27/2023   • Arcus senilis of both corneas 09/27/2023   • Hollenhorst plaque 09/27/2023   • Migraine aura without headache 09/27/2023   • Mixed hyperlipidemia 09/27/2023   • Nevus of choroid 09/27/2023   • Nuclear sclerotic cataract of both eyes 09/27/2023   • Sleep apnea 09/27/2023   • Transient visual loss of both eyes 09/27/2023   • Vitamin D deficiency 09/27/2023   • Cornea guttata 09/27/2023   • GERD (gastroesophageal  reflux disease) 09/27/2023   • 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 09/27/2023     Past Medical History:   Diagnosis Date   • Arthritis    • Calcaneal spur, unspecified foot     Heel spur   • Consumes two servings of caffeine per day    • Endothelial corneal dystrophy, unspecified eye 05/07/2018    Corneal guttata   • GERD (gastroesophageal reflux disease)    • Migraine headache    • Seasonal allergies    • Spinal stenosis, lumbar region, without neurogenic claudication 11/28/2006   • Unspecified lump in unspecified breast     Breast nodule   • Wears glasses      Past Surgical History:   Procedure Laterality Date   • ACHILLES TENDON SURGERY Left 2014   • ACHILLES TENDON SURGERY Left 02/16/2024   • BREAST BIOPSY Right 1992    benign x2   • COLONOSCOPY     • GLAUCOMA SURGERY     • PLANTAR FASCIA RELEASE Right 1999   • TOTAL KNEE ARTHROPLASTY Bilateral     2011 and 2019   • WISDOM TOOTH EXTRACTION         Current Outpatient Medications   Medication Instructions   • aspirin 325 mg tablet 1 tablet, oral, Daily   • carboxymethylcellulose (Refresh Plus) 0.5 % ophthalmic solution 1 drop, Both Eyes, 2 times daily PRN   • erythromycin base (E-MYCIN) 1,000 mg, oral,  TAKE 4 TABLETS 1 HOUR PRIOR TO APPOINTMENT   • multivitamin (Daily Multi-Vitamin) tablet 1 tablet, oral, Daily   • nabumetone (RELAFEN) 750 mg, oral, 2 times daily PRN   • omeprazole (PriLOSEC) 40 mg DR capsule 1 tablet, oral, Daily   • tolterodine LA (DETROL LA) 4 mg, oral, Daily, PRN     Allergies   Allergen Reactions   • Atorvastatin Drowsiness   • Bee Pollen Headache   • Bee Venom Protein (Honey Bee) Swelling   • Penicillins Hives     Review of Systems   Constitutional:  Positive for fatigue. Negative for fever and unexpected weight change.   HENT:  Positive for tinnitus. Negative for congestion, hearing loss, mouth sores, nosebleeds, sneezing, sore throat and trouble swallowing.         Dry mouth   Eyes:  Positive for itching. Negative for  "photophobia, pain, discharge, redness and visual disturbance.        +dry eye   Respiratory:  Negative for cough, shortness of breath and wheezing.    Cardiovascular:  Negative for chest pain, palpitations and leg swelling.   Gastrointestinal:  Negative for abdominal pain, constipation, diarrhea, nausea and vomiting.        Heartburn   Endocrine: Negative for polydipsia and polyuria.   Genitourinary:  Negative for difficulty urinating, dysuria, frequency and hematuria.   Musculoskeletal:  Positive for arthralgias, back pain, gait problem, joint swelling and myalgias.   Skin:  Negative for color change and rash.   Allergic/Immunologic: Negative for immunocompromised state.   Neurological:  Positive for weakness and numbness. Negative for dizziness and headaches.   Hematological:  Does not bruise/bleed easily.   Psychiatric/Behavioral:  Positive for sleep disturbance. Negative for confusion and decreased concentration. The patient is not nervous/anxious.        PHYSICAL EXAM  /86   Pulse 64   Temp 36.2 °C (97.2 °F) (Temporal)   Ht 1.727 m (5' 8\")   Wt 120 kg (265 lb)   SpO2 98%   BMI 40.29 kg/m²   Physical Exam  Vitals reviewed.   Constitutional:       General: She is not in acute distress.     Appearance: Normal appearance.   HENT:      Head: Normocephalic and atraumatic.   Eyes:      Conjunctiva/sclera: Conjunctivae normal.      Pupils: Pupils are equal, round, and reactive to light.   Cardiovascular:      Pulses: Normal pulses.   Pulmonary:      Effort: Pulmonary effort is normal. No respiratory distress.   Musculoskeletal:         General: Swelling present. No tenderness or deformity.      Cervical back: Normal range of motion.      Right lower leg: No edema.      Left lower leg: No edema.      Comments: Kyphosis and scoliosis noted  +paraspinal muscle tightness  +swelling CMC  No synovitis PIP, MCP, DIP, wrists, elbows, shoulders  S/p B TKR  L foot/ankle in cast  In wheelchair   Skin:     Coloration: " Skin is not pale.      Findings: No erythema or rash.   Neurological:      General: No focal deficit present.      Mental Status: She is alert and oriented to person, place, and time. Mental status is at baseline.      Sensory: No sensory deficit.   Psychiatric:         Mood and Affect: Mood normal.         Assessment/plan  Problem List Items Addressed This Visit       Generalized osteoarthritis of multiple sites - Primary    Current Assessment & Plan     OA of multiple joints.  Will try NSAID to see if that provides consistent relief of symptoms.  Pt can take med as needed.   Pt to call if not better in the next several weeks         Relevant Medications    nabumetone (Relafen) 750 mg tablet    Class 3 severe obesity due to excess calories with serious comorbidity and body mass index (BMI) of 40.0 to 44.9 in adult (CMS/[facility])     Follow up: ____4_months    The patient's labs, radiology images and reports and other tests done prior to appointment were obtained, reviewed and summarized as applicable from the physician portal, EHR symptoms and/or outside sources.  Pertinent positive and negative findings were considered in medical decision making.  Old records were reviewed and further were requested from previous physicians  All questions were answered and patient was counseled regarding diagnosis, prognosis, risks and benefits of various treatment options and importance of compliance with therapy      Patient was identified as a fall risk. Risk prevention instructions provided.

## 2024-04-02 ENCOUNTER — OFFICE VISIT (OUTPATIENT)
Dept: OPHTHALMOLOGY | Facility: CLINIC | Age: 64
End: 2024-04-02
Payer: MEDICARE

## 2024-04-02 DIAGNOSIS — H43.21 ASTEROID HYALITIS OF RIGHT EYE: ICD-10-CM

## 2024-04-02 DIAGNOSIS — H40.243 RESIDUAL STAGE OF ANGLE-CLOSURE GLAUCOMA OF BOTH EYES: Primary | ICD-10-CM

## 2024-04-02 DIAGNOSIS — H18.513 CORNEAL GUTTATA OF BOTH EYES: ICD-10-CM

## 2024-04-02 DIAGNOSIS — H25.13 NUCLEAR SCLEROTIC CATARACT OF BOTH EYES: ICD-10-CM

## 2024-04-02 DIAGNOSIS — H11.31 CONJUNCTIVAL HEMORRHAGE OF RIGHT EYE: ICD-10-CM

## 2024-04-02 DIAGNOSIS — D31.32 NEVUS OF CHOROID OF LEFT EYE: ICD-10-CM

## 2024-04-02 PROCEDURE — 92133 CPTRZD OPH DX IMG PST SGM ON: CPT | Performed by: OPHTHALMOLOGY

## 2024-04-02 PROCEDURE — 92014 COMPRE OPH EXAM EST PT 1/>: CPT | Performed by: OPHTHALMOLOGY

## 2024-04-02 ASSESSMENT — CONF VISUAL FIELD
OS_INFERIOR_NASAL_RESTRICTION: 0
OD_NORMAL: 1
OS_INFERIOR_TEMPORAL_RESTRICTION: 0
OS_SUPERIOR_NASAL_RESTRICTION: 0
OD_SUPERIOR_NASAL_RESTRICTION: 0
OS_NORMAL: 1
OS_SUPERIOR_TEMPORAL_RESTRICTION: 0
OD_INFERIOR_NASAL_RESTRICTION: 0
OD_SUPERIOR_TEMPORAL_RESTRICTION: 0
OD_INFERIOR_TEMPORAL_RESTRICTION: 0

## 2024-04-02 ASSESSMENT — VISUAL ACUITY
METHOD: SNELLEN - LINEAR
OS_SC: 20/70
OS_CC: 20/25
OD_CC: 20/25
OD_SC: 20/20-2

## 2024-04-02 ASSESSMENT — EXTERNAL EXAM - LEFT EYE: OS_EXAM: NORMAL

## 2024-04-02 ASSESSMENT — TONOMETRY
IOP_METHOD: GOLDMANN APPLANATION
OD_IOP_MMHG: 13
OS_IOP_MMHG: 13

## 2024-04-02 ASSESSMENT — CUP TO DISC RATIO
OD_RATIO: 0.2
OS_RATIO: 0.2

## 2024-04-02 ASSESSMENT — PACHYMETRY
OS_CT(UM): 550
OD_CT(UM): 572

## 2024-04-02 ASSESSMENT — EXTERNAL EXAM - RIGHT EYE: OD_EXAM: NORMAL

## 2024-04-02 ASSESSMENT — SLIT LAMP EXAM - LIDS
COMMENTS: GOOD POSITION
COMMENTS: GOOD POSITION

## 2024-04-02 NOTE — PROGRESS NOTES
Annual visit  Had torn achilles of L foot and is status post (s/p) surgery    Nuclear cataract OS (along with corneal guttata)  -patient has dense endonuclear cataract with myopic shift.   This Is affecting her ADL's.   UCVA is decreased from prior visit  Would recommend cataract surgery vs changing Mrx.   RBA explained including swelling from presence of guttae. Patient agrees.  Refer to Dr. Rojas for cataract eval.    2. ARMIN OD  -small area near limbus at 11 oclock. Patient not aware of this, denies HBP, trauma or valsalva  Monitor    3. PAC suspect OU   long term patient of Dr. Soto, transferring care to  [facility] after Dr. Soto Retired   h/p LPI OU > 10 years ago by dr. soto   regular OCT and VF - both WNL    Testing  Tmax: 12/12  CCT: 572/550  OCT WNL OU  CDR: 0.2/0.2  Laser peripheral iridotomy (LPI): Patent OU    on exam today:   IOP 13/13  OCT WNL  P: monitor with yearly OCT     4. Refractive error:  tried progressives but not working for her, has to move her head to find the sweet spot  will have to go with trifocals,   Tried trifocals but still not adjusting well.  Recommend bifocals after cataract surgery OS    5. Choroidal nevus OS  Nasally, no drusen   stable, Ctm    6. Asteroid OD   CTM     7. Cornela guttae   no fhx of transp[lant   CCT: 572/550   ctm     8. h/o amaurosis fugax OS 2018  - zavala done was negative  -no more episodes      9. h/o migraines   No more issues, lubrication helps

## 2024-07-25 ENCOUNTER — APPOINTMENT (OUTPATIENT)
Dept: RHEUMATOLOGY | Facility: CLINIC | Age: 64
End: 2024-07-25
Payer: MEDICARE

## 2024-08-12 ENCOUNTER — APPOINTMENT (OUTPATIENT)
Dept: OPHTHALMOLOGY | Facility: CLINIC | Age: 64
End: 2024-08-12
Payer: MEDICARE

## 2024-08-12 DIAGNOSIS — H40.243 RESIDUAL STAGE OF ANGLE-CLOSURE GLAUCOMA OF BOTH EYES: ICD-10-CM

## 2024-08-12 DIAGNOSIS — H18.513 CORNEAL GUTTATA OF BOTH EYES: ICD-10-CM

## 2024-08-12 DIAGNOSIS — D31.32 NEVUS OF CHOROID OF LEFT EYE: ICD-10-CM

## 2024-08-12 DIAGNOSIS — H25.812 COMBINED FORM OF AGE-RELATED CATARACT, LEFT EYE: Primary | ICD-10-CM

## 2024-08-12 DIAGNOSIS — H43.21 ASTEROID HYALOSIS OF RIGHT EYE: ICD-10-CM

## 2024-08-12 DIAGNOSIS — H25.811 COMBINED FORM OF AGE-RELATED CATARACT, RIGHT EYE: ICD-10-CM

## 2024-08-12 LAB
CELLS COUNTED (OD): 611 CELLS/MM2
CELLS COUNTED (OS): 2321 CELLS/MM2
CENTRAL CORNEA THICKNESS (OD): 548 MICRONS
CENTRAL CORNEA THICKNESS (OS): 538 MICRONS

## 2024-08-12 PROCEDURE — 92286 ANT SGM IMG I&R SPECLR MIC: CPT | Performed by: OPHTHALMOLOGY

## 2024-08-12 PROCEDURE — 92136 OPHTHALMIC BIOMETRY: CPT | Mod: BILATERAL PROCEDURE | Performed by: OPHTHALMOLOGY

## 2024-08-12 PROCEDURE — 92025 CPTRIZED CORNEAL TOPOGRAPHY: CPT | Performed by: OPHTHALMOLOGY

## 2024-08-12 PROCEDURE — 99214 OFFICE O/P EST MOD 30 MIN: CPT | Performed by: OPHTHALMOLOGY

## 2024-08-12 RX ORDER — PHENYLEPHRINE HYDROCHLORIDE 25 MG/ML
1 SOLUTION/ DROPS OPHTHALMIC
OUTPATIENT
Start: 2024-08-12 | End: 2024-08-12

## 2024-08-12 RX ORDER — CYCLOPENTOLATE HYDROCHLORIDE 10 MG/ML
1 SOLUTION/ DROPS OPHTHALMIC
OUTPATIENT
Start: 2024-08-12 | End: 2024-08-12

## 2024-08-12 RX ORDER — TROPICAMIDE 10 MG/ML
1 SOLUTION/ DROPS OPHTHALMIC
OUTPATIENT
Start: 2024-08-12 | End: 2024-08-12

## 2024-08-12 RX ORDER — MOXIFLOXACIN 5 MG/ML
1 SOLUTION/ DROPS OPHTHALMIC
OUTPATIENT
Start: 2024-08-12 | End: 2024-08-12

## 2024-08-12 RX ORDER — TETRACAINE HYDROCHLORIDE 5 MG/ML
1 SOLUTION OPHTHALMIC ONCE
OUTPATIENT
Start: 2024-08-12 | End: 2024-08-12

## 2024-08-12 ASSESSMENT — VISUAL ACUITY
OD_BAT_HIGH: 20/40
OD_SC: 20/30
CORRECTION_TYPE: GLASSES
OS_CC: 20/50-1
METHOD: SNELLEN - LINEAR

## 2024-08-12 ASSESSMENT — REFRACTION_MANIFEST
OS_ADD: +2.50
OD_ADD: +2.50
OD_CYLINDER: -0.25
OS_SPHERE: -2.50
OS_CYLINDER: SPHERE
OD_SPHERE: +0.00
OD_AXIS: 083

## 2024-08-12 ASSESSMENT — PACHYMETRY
OS_CT(UM): 550
OD_CT(UM): 572

## 2024-08-12 ASSESSMENT — CONF VISUAL FIELD
OD_INFERIOR_TEMPORAL_RESTRICTION: 0
OD_INFERIOR_NASAL_RESTRICTION: 0
OD_SUPERIOR_NASAL_RESTRICTION: 0
OD_SUPERIOR_TEMPORAL_RESTRICTION: 0
OD_NORMAL: 1

## 2024-08-12 ASSESSMENT — SLIT LAMP EXAM - LIDS
COMMENTS: NORMAL
COMMENTS: NORMAL

## 2024-08-12 ASSESSMENT — REFRACTION_WEARINGRX
OS_ADD: +2.50
OD_SPHERE: +0.00
SPECS_TYPE: TRIFOCAL
OS_CYLINDER: SPHERE
OD_CYLINDER: -0.25
OS_SPHERE: -1.50
OD_ADD: +2.50
OD_AXIS: 083

## 2024-08-12 ASSESSMENT — ENCOUNTER SYMPTOMS: EYES NEGATIVE: 1

## 2024-08-12 ASSESSMENT — TONOMETRY
OD_IOP_MMHG: 10
IOP_METHOD: GOLDMANN APPLANATION
OS_IOP_MMHG: 12

## 2024-08-12 ASSESSMENT — CUP TO DISC RATIO
OD_RATIO: 0.2
OS_RATIO: 0.2

## 2024-08-12 ASSESSMENT — EXTERNAL EXAM - RIGHT EYE: OD_EXAM: NORMAL

## 2024-08-12 ASSESSMENT — EXTERNAL EXAM - LEFT EYE: OS_EXAM: NORMAL

## 2024-08-12 NOTE — PROGRESS NOTES
Assessment/Plan   Diagnoses and all orders for this visit:  Combined form of age-related cataract, left eye  Combined form of age-related cataract, left eyeH25.812  Visually significant. Pt would like to proceed with surgery.    Visually significant cataract OS. BCVA: 20/50. Symptoms: blurry vision, glare. A change in glasses prescription will not result in significant visual improvement at this time.  Indication for cataract surgery: Input To potentially improve visual acuity and improve quality of life/reduce symptoms.   Based on a comprehensive eye exam performed today, a visually significant cataract appears to be the source of decreased vision, diminished quality of life, and impairment of activities of daily living. Discussed option of cataract surgery vs observation. Patient can no longer function adequately with current best corrected visual acuity and wishes to have cataract surgery at this time. Discussed surgical procedure with patient. As a result of cataract extraction, it is believed that the patient will experience improved vision. Discussed potential risks, benefits, and complications of cataract surgery including but not limited to pain, bleeding, infection, inflammation, edema, increased eye pressure, retinal tear/detachment, lens dislocation, ptosis, iris damage, need for additional surgery, need for glasses after surgery, loss of vision/loss of eye. Patient understands and wishes to proceed. All questions were answered. Will schedule cataract surgery OS. Lenstar done today.   Discussed IOL options (standard monofocal, monofocal with monovision, toric, multifocal). Lens chosen: Pt will consider Apthera IOL vs standard monofocal. Had thorough discussion with patient re: aim. Discussed that may potentially need glasses for best vision both at distance and at near.     Schedule cataract surgery OS  I personally reviewed the lenstar measurements and will choose the lens accordingly.  Combined form of  age-related cataract, right eye  Combined forms of age-related cataract of right eyeH25.811  Visually significant. Pt would like to proceed with surgery.    Visually significant cataract OD. BCVA: 20/30. Glare: 20/40. Symptoms: blurry vision, glare. A change in glasses prescription will not result in significant visual improvement at this time.  Indication for cataract surgery: Input To potentially improve visual acuity and improve quality of life/reduce symptoms.   Based on a comprehensive eye exam performed today, a visually significant cataract appears to be the source of decreased vision, diminished quality of life, and impairment of activities of daily living. Discussed option of cataract surgery vs observation. Patient can no longer function adequately with current best corrected visual acuity and wishes to have cataract surgery at this time. Discussed surgical procedure with patient. As a result of cataract extraction, it is believed that the patient will experience improved vision. Discussed potential risks, benefits, and complications of cataract surgery including but not limited to pain, bleeding, infection, inflammation, edema, increased eye pressure, retinal tear/detachment, lens dislocation, ptosis, iris damage, need for additional surgery, need for glasses after surgery, loss of vision/loss of eye. Patient understands and wishes to proceed. All questions were answered. Will schedule cataract surgery OD. Lenstar done today.  Discussed IOL options (standard monofocal, monofocal with monovision, toric, multifocal). Lens chosen: standard monofocal. Defer/decline toric/multifocal lens at this time. Had thorough discussion with patient re: aim. Discussed that may potentially need glasses for best vision both at distance and at near.    Schedule cataract surgery OD  I personally reviewed the lenstar measurements and will choose the lens accordingly.  Corneal guttata of both eyes  -     Corneal Topography -  OU - Both Eyes  -     Endothelial Photo and Cell Count - OU - Both Eyes  Confluent guttata central 2mm OD  Discussed with pt higher likelihood of needing EK after CE than the general population due FECD. Also, explained delayed vision recovery due to early k edema. The patient understands.   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  S/p LPI OU  Asteroid hyalosis of right eye  Nevus of choroid of left eye  Monitor

## 2024-08-21 ENCOUNTER — HOSPITAL ENCOUNTER (OUTPATIENT)
Facility: CLINIC | Age: 64
Setting detail: OUTPATIENT SURGERY
Discharge: HOME | End: 2024-08-21
Attending: OPHTHALMOLOGY | Admitting: OPHTHALMOLOGY
Payer: MEDICARE

## 2024-08-21 ENCOUNTER — ANESTHESIA (OUTPATIENT)
Dept: OPERATING ROOM | Facility: CLINIC | Age: 64
End: 2024-08-21
Payer: MEDICARE

## 2024-08-21 ENCOUNTER — ANESTHESIA EVENT (OUTPATIENT)
Dept: OPERATING ROOM | Facility: CLINIC | Age: 64
End: 2024-08-21
Payer: MEDICARE

## 2024-08-21 VITALS
WEIGHT: 272.05 LBS | HEART RATE: 61 BPM | BODY MASS INDEX: 41.23 KG/M2 | OXYGEN SATURATION: 95 % | DIASTOLIC BLOOD PRESSURE: 89 MMHG | HEIGHT: 68 IN | RESPIRATION RATE: 16 BRPM | SYSTOLIC BLOOD PRESSURE: 133 MMHG | TEMPERATURE: 97.3 F

## 2024-08-21 DIAGNOSIS — H25.812 COMBINED FORM OF AGE-RELATED CATARACT, LEFT EYE: ICD-10-CM

## 2024-08-21 DIAGNOSIS — H25.811 COMBINED FORM OF AGE-RELATED CATARACT, RIGHT EYE: Primary | ICD-10-CM

## 2024-08-21 PROCEDURE — IOLAC: Performed by: OPHTHALMOLOGY

## 2024-08-21 PROCEDURE — 2500000001 HC RX 250 WO HCPCS SELF ADMINISTERED DRUGS (ALT 637 FOR MEDICARE OP): Performed by: OPHTHALMOLOGY

## 2024-08-21 PROCEDURE — 7100000009 HC PHASE TWO TIME - INITIAL BASE CHARGE: Performed by: OPHTHALMOLOGY

## 2024-08-21 PROCEDURE — 3700000002 HC GENERAL ANESTHESIA TIME - EACH INCREMENTAL 1 MINUTE: Performed by: OPHTHALMOLOGY

## 2024-08-21 PROCEDURE — 3600000008 HC OR TIME - EACH INCREMENTAL 1 MINUTE - PROCEDURE LEVEL THREE: Performed by: OPHTHALMOLOGY

## 2024-08-21 PROCEDURE — 66984 XCAPSL CTRC RMVL W/O ECP: CPT | Performed by: OPHTHALMOLOGY

## 2024-08-21 PROCEDURE — 7100000010 HC PHASE TWO TIME - EACH INCREMENTAL 1 MINUTE: Performed by: OPHTHALMOLOGY

## 2024-08-21 PROCEDURE — 3700000001 HC GENERAL ANESTHESIA TIME - INITIAL BASE CHARGE: Performed by: OPHTHALMOLOGY

## 2024-08-21 PROCEDURE — 2500000004 HC RX 250 GENERAL PHARMACY W/ HCPCS (ALT 636 FOR OP/ED): Performed by: OPHTHALMOLOGY

## 2024-08-21 PROCEDURE — 2720000007 HC OR 272 NO HCPCS: Performed by: OPHTHALMOLOGY

## 2024-08-21 PROCEDURE — C1780 LENS, INTRAOCULAR (NEW TECH): HCPCS | Performed by: OPHTHALMOLOGY

## 2024-08-21 PROCEDURE — 3600000003 HC OR TIME - INITIAL BASE CHARGE - PROCEDURE LEVEL THREE: Performed by: OPHTHALMOLOGY

## 2024-08-21 PROCEDURE — 2500000005 HC RX 250 GENERAL PHARMACY W/O HCPCS: Performed by: OPHTHALMOLOGY

## 2024-08-21 DEVICE — ONE-PIECE UV BLOCKING, HYDROPHOBIC ACRYLIC SMALL APERTURE INTRAOCULAR LENS.
Type: IMPLANTABLE DEVICE | Site: EYE | Status: FUNCTIONAL
Brand: IC-8 APTHERA IOL

## 2024-08-21 RX ORDER — PREDNISOLONE ACETATE 10 MG/ML
1 SUSPENSION/ DROPS OPHTHALMIC 4 TIMES DAILY
Qty: 5 ML | Refills: 0 | Status: SHIPPED | OUTPATIENT
Start: 2024-08-21

## 2024-08-21 RX ORDER — TROPICAMIDE 10 MG/ML
1 SOLUTION/ DROPS OPHTHALMIC
Status: COMPLETED | OUTPATIENT
Start: 2024-08-21 | End: 2024-08-21

## 2024-08-21 RX ORDER — MOXIFLOXACIN 5 MG/ML
SOLUTION/ DROPS OPHTHALMIC AS NEEDED
Status: DISCONTINUED | OUTPATIENT
Start: 2024-08-21 | End: 2024-08-21 | Stop reason: HOSPADM

## 2024-08-21 RX ORDER — ONDANSETRON HYDROCHLORIDE 2 MG/ML
4 INJECTION, SOLUTION INTRAVENOUS ONCE AS NEEDED
Status: DISCONTINUED | OUTPATIENT
Start: 2024-08-21 | End: 2024-08-21 | Stop reason: HOSPADM

## 2024-08-21 RX ORDER — SODIUM CHLORIDE, SODIUM LACTATE, POTASSIUM CHLORIDE, CALCIUM CHLORIDE 600; 310; 30; 20 MG/100ML; MG/100ML; MG/100ML; MG/100ML
100 INJECTION, SOLUTION INTRAVENOUS CONTINUOUS
Status: DISCONTINUED | OUTPATIENT
Start: 2024-08-21 | End: 2024-08-21 | Stop reason: HOSPADM

## 2024-08-21 RX ORDER — DIPHENHYDRAMINE HCL 25 MG
25 TABLET ORAL DAILY PRN
COMMUNITY

## 2024-08-21 RX ORDER — LIDOCAINE IN NACL,ISO-OSMOT/PF 30 MG/3 ML
0.1 SYRINGE (ML) INJECTION ONCE
Status: DISCONTINUED | OUTPATIENT
Start: 2024-08-21 | End: 2024-08-21 | Stop reason: HOSPADM

## 2024-08-21 RX ORDER — ASPIRIN 81 MG/1
81 TABLET ORAL DAILY
COMMUNITY

## 2024-08-21 RX ORDER — TRIAMCINOLONE ACETONIDE 40 MG/ML
INJECTION, SUSPENSION INTRA-ARTICULAR; INTRAMUSCULAR AS NEEDED
Status: DISCONTINUED | OUTPATIENT
Start: 2024-08-21 | End: 2024-08-21 | Stop reason: HOSPADM

## 2024-08-21 RX ORDER — MOXIFLOXACIN 5 MG/ML
1 SOLUTION/ DROPS OPHTHALMIC
Status: COMPLETED | OUTPATIENT
Start: 2024-08-21 | End: 2024-08-21

## 2024-08-21 RX ORDER — CYCLOPENTOLATE HYDROCHLORIDE 10 MG/ML
1 SOLUTION/ DROPS OPHTHALMIC
Status: COMPLETED | OUTPATIENT
Start: 2024-08-21 | End: 2024-08-21

## 2024-08-21 RX ORDER — ALBUTEROL SULFATE 0.83 MG/ML
2.5 SOLUTION RESPIRATORY (INHALATION) ONCE AS NEEDED
Status: DISCONTINUED | OUTPATIENT
Start: 2024-08-21 | End: 2024-08-21 | Stop reason: HOSPADM

## 2024-08-21 RX ORDER — PHENYLEPHRINE HYDROCHLORIDE 25 MG/ML
1 SOLUTION/ DROPS OPHTHALMIC
Status: COMPLETED | OUTPATIENT
Start: 2024-08-21 | End: 2024-08-21

## 2024-08-21 RX ORDER — ACETAMINOPHEN 325 MG/1
TABLET ORAL AS NEEDED
Status: DISCONTINUED | OUTPATIENT
Start: 2024-08-21 | End: 2024-08-21

## 2024-08-21 RX ORDER — TETRACAINE HYDROCHLORIDE 5 MG/ML
1 SOLUTION OPHTHALMIC ONCE
Status: COMPLETED | OUTPATIENT
Start: 2024-08-21 | End: 2024-08-21

## 2024-08-21 RX ORDER — LIDOCAINE HYDROCHLORIDE 10 MG/ML
INJECTION INFILTRATION; PERINEURAL AS NEEDED
Status: DISCONTINUED | OUTPATIENT
Start: 2024-08-21 | End: 2024-08-21 | Stop reason: HOSPADM

## 2024-08-21 SDOH — HEALTH STABILITY: MENTAL HEALTH: CURRENT SMOKER: 0

## 2024-08-21 ASSESSMENT — PAIN SCALES - GENERAL
PAINLEVEL_OUTOF10: 0 - NO PAIN
PAINLEVEL_OUTOF10: 0 - NO PAIN

## 2024-08-21 ASSESSMENT — COLUMBIA-SUICIDE SEVERITY RATING SCALE - C-SSRS
1. IN THE PAST MONTH, HAVE YOU WISHED YOU WERE DEAD OR WISHED YOU COULD GO TO SLEEP AND NOT WAKE UP?: NO
6. HAVE YOU EVER DONE ANYTHING, STARTED TO DO ANYTHING, OR PREPARED TO DO ANYTHING TO END YOUR LIFE?: NO
2. HAVE YOU ACTUALLY HAD ANY THOUGHTS OF KILLING YOURSELF?: NO

## 2024-08-21 ASSESSMENT — PAIN - FUNCTIONAL ASSESSMENT
PAIN_FUNCTIONAL_ASSESSMENT: 0-10
PAIN_FUNCTIONAL_ASSESSMENT: 0-10

## 2024-08-21 NOTE — H&P
History Of Present Illness  Tami Faustin is a 63 y.o. female presenting with cataract in left eye here for cataract extraction and intraocular lens (IOL) insertion in left eye.     Past Medical History  Past Medical History:   Diagnosis Date    Arthritis     Calcaneal spur, unspecified foot     Heel spur    Consumes two servings of caffeine per day     Endothelial corneal dystrophy, unspecified eye 05/07/2018    Corneal guttata    GERD (gastroesophageal reflux disease)     Hyperlipidemia     Migraine headache     PONV (postoperative nausea and vomiting)     Seasonal allergies     Spinal stenosis, lumbar region, without neurogenic claudication 11/28/2006    Unspecified lump in unspecified breast     Breast nodule    Wears glasses        Surgical History  Past Surgical History:   Procedure Laterality Date    ACHILLES TENDON SURGERY Left 02/16/2024    BREAST BIOPSY Right 1992    benign x2    COLONOSCOPY      GLAUCOMA SURGERY Bilateral     by Dr. Juan Soto    PLANTAR FASCIA RELEASE Right 1999    TOTAL KNEE ARTHROPLASTY Left     partial 2011  and right total 2018    WISDOM TOOTH EXTRACTION          Social History  She reports that she has never smoked. She has never used smokeless tobacco. She reports current alcohol use. She reports that she does not use drugs.    Family History  Family History   Problem Relation Name Age of Onset    Hypertension Mother      Diabetes Mother      COPD Mother      Heart failure Mother      Hypertension Father      COPD Father      Emphysema Father      Cataracts Father      Cancer Maternal Grandmother      Diabetes Maternal Grandmother      Macular degeneration Maternal Grandmother          Allergies  Atorvastatin, Bee pollen, Bee venom protein (honey bee), and Penicillins    Review of Systems   All other systems reviewed and are negative.       Physical Exam  HENT:      Head: Atraumatic.   Eyes:      Extraocular Movements: Extraocular movements intact.   Cardiovascular:      Rate  and Rhythm: Normal rate.   Pulmonary:      Effort: Pulmonary effort is normal.   Neurological:      Mental Status: She is alert.          Last Recorded Vitals  There were no vitals taken for this visit.    Relevant Results             Assessment/Plan   Assessment & Plan  Combined form of age-related cataract, left eye      63 y.o. female presenting with cataract in left eye here for cataract extraction and intraocular lens (IOL) insertion in left eye.           John Acevedo MD

## 2024-08-21 NOTE — OP NOTE
Cataract extraction with intraocular lens implantation OS (L) Operative Note     Date: 2024  [facility]    Name: Tami Faustin, : 1960, Age: 63 y.o., MRN: [id number], Sex: female    Diagnosis  Pre-op Diagnosis      * Combined form of age-related cataract, left eye [H25.812] Post-op Diagnosis     * Combined form of age-related cataract, left eye [H25.812]     Procedures  Cataract extraction with intraocular lens implantation OS  28296 - [place] XCAPSL CTRC RMVL INSJ IO LENS PROSTH W/O ECP      Surgeons      * Jcarlos Rojas - Primary    Resident/Fellow/Other Assistant:  Surgeons and Role:  * No surgeons found with a matching role *    Procedure Summary  Anesthesia: Monitor Anesthesia Care  ASA: II  Anesthesia Staff: Anesthesiologist: Jelena Soto MD  C-AA: SUPRIYA Alaniz  Estimated Blood Loss: 0mL  Intra-op Medications:   Administrations occurring from 1050 to 1130 on 24:   Medication Name Total Dose   balanced salts (BSS) intraocular solution 500 mL   lidocaine (Xylocaine) 10 mg/mL (1 %) injection 1.5 mL   chondroitin sulf-sod hyaluron (Duovisc) intraocular kit 0.55 mL   moxifloxacin (Vigamox) 0.5 % ophthalmic solution 1 drop   triamcinolone acetonide (Kenalog-40) injection 8 mg   lactated Ringer's infusion Cannot be calculated              Anesthesia Record               Intraprocedure I/O Totals       None           Specimen: No specimens collected     Staff:   Circulator: Bairon  Scrub Person: Mehnaz         Drains and/or Catheters: * None in log *    Tourniquet Times:         Implants:  Implants       Type Name Action Serial No.       ACUFOCUS IC-8 APTHERA; 26 DIOPTER Implanted               Findings: Cataract, presbyopia OS    Indications: Tami Faustin is an 63 y.o. female who is having surgery for Combined form of age-related cataract, left eye [H25.812].     The patient was seen in the preoperative area. The risks, benefits, complications, treatment options,  non-operative alternatives, expected recovery and outcomes were discussed with the patient. The possibilities of reaction to medication, pulmonary aspiration, injury to surrounding structures, bleeding, recurrent infection, the need for additional procedures, failure to diagnose a condition, and creating a complication requiring transfusion or operation were discussed with the patient. The patient concurred with the proposed plan, giving informed consent.  The site of surgery was properly noted/marked if necessary per policy. The patient has been actively warmed in preoperative area. Preoperative antibiotics have been ordered and given within 1 hours of incision. Venous thrombosis prophylaxis are not indicated.    Procedure Details: The patient was placed in the supine position on the operating room table where appropriate blood pressure and cardiac monitoring were initiated. The patient was prepped and draped in the usual sterile fashion for intraocular surgery. This included instillation of Betadine 5% onto the ocular surface followed by irrigation with balance salt solution a minute or two later. A lid speculum was placed and the operating microscope was positioned. One paracentesis stab incision was made to the left of the planned cataract incision with a 15-degree supersharp blade. 1 ml of preservative free lidocaine was injected into the AC. Viscoat was used to replace the aqueous humor. A temporal clear corneal wound was fashioned beginning at the limbus with a 2.2 mm keratome, extending 2 mm into clear cornea before entering the anterior chamber. A continuous tear circular capsulorhexis of approximately 5 mm in diameter was performed. Hydrodissection was performed using a Plummer canula. The endothelium was coated with viscoat again. Using the Ozil handpiece on the Sidecar.me Lens Removal System, the nucleus was emulsified and aspirated using a divide-and-conquer technique. Residual cortex was removed from the  eye with the irrigation/aspiration bimanually. ProVisc was used to inflate the capsular bag. The incision was dilated to 3.2mm. The lens implant was inspected and found to be free of visible defects. The lens used was model Apthera IC-8, power 26.0 diopter intraocular lens. The lens was inserted into the capsular bag. The lens was centered with a Mao hook. Residual viscoelastic was removed from the eye with the irrigation/aspiration instrument. Preservative free moxifloxacin was injected  intracameral. The wounds were checked and found to be watertight. 0.3ml of Diluted (1:3) triamcinolone acetonide was injected subonj inferiorly. The lid speculum was removed and the eye was dressed with Maxitrol ointment, eye pad, tape, and shield. The patient tolerated the procedure well, and there were no complications.   Complications:  None; patient tolerated the procedure well.    Disposition: PACU - hemodynamically stable.  Condition: stable         Additional Details: None    Attending Attestation: I performed the procedure.    Jcarlos Rojas  Phone Number: [phone]

## 2024-08-21 NOTE — DISCHARGE INSTRUCTIONS
Postop instructions   Prednisolone acetate drops:  4 times/day for 1 week  3 times/day for 1 week  2 times/day for 1 week  Once/day for 1 week      Sleep with shield at night for 7 days  No eye rubbing  May shower and wash your face but no water inside surgery eye  No lifting any weight above 10lbs   Follow up as scheduled tomorrow with Dr. Rojas    Tylenol  given at 1030, no more until 430 pm

## 2024-08-21 NOTE — ANESTHESIA POSTPROCEDURE EVALUATION
Patient: Tami Faustin    Procedure Summary       Date: 08/21/24 Room / Location: [facility] OR 04 / [facility]    Anesthesia Start: 1039 Anesthesia Stop: 1107    Procedure: Cataract extraction with intraocular lens implantation OS (Left: Eye) Diagnosis:       Combined form of age-related cataract, left eye      (Combined form of age-related cataract, left eye [H25.812])    Surgeons: Jacrlos Rojas MD Responsible Provider: Jelena Soto MD    Anesthesia Type: MAC ASA Status: 2            Anesthesia Type: MAC    Vitals Value Taken Time   /89 08/21/24 1107   Temp 36.3 °C (97.3 °F) 08/21/24 1107   Pulse 61 08/21/24 1107   Resp 16 08/21/24 1107   SpO2 95 % 08/21/24 1107       Anesthesia Post Evaluation    Patient location during evaluation: PACU  Patient participation: complete - patient participated  Level of consciousness: awake and alert  Pain management: adequate  Airway patency: patent  Cardiovascular status: acceptable  Respiratory status: acceptable  Hydration status: acceptable  Postoperative Nausea and Vomiting: none        There were no known notable events for this encounter.

## 2024-08-21 NOTE — ANESTHESIA PREPROCEDURE EVALUATION
Patient: Tami Faustin    Procedure Information       Date/Time: 08/21/24 1050    Procedure: Cataract extraction with intraocular lens implantation OS (Left)    Location: [facility] OR 04 / [facility]    Surgeons: Jcarlos Rojas MD          64 y/o here for cataract       Relevant Problems   Cardiac   (+) Mixed hyperlipidemia      Neuro   (+) Amaurosis fugax of left eye      GI   (+) GERD (gastroesophageal reflux disease)      Endocrine   (+) Class 3 severe obesity due to excess calories with serious comorbidity and body mass index (BMI) of 40.0 to 44.9 in adult (Multi)      Musculoskeletal   (+) Generalized osteoarthritis of multiple sites      HEENT   (+) 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       Patient: Tami Faustin    Procedure Information       Date/Time: 08/21/24 1050    Procedure: Cataract extraction with intraocular lens implantation OS (Left)    Location: [facility] OR 04 / [facility] OR    Surgeons: Jcarlos Rojas MD                Clinical information reviewed:   Tobacco  Allergies  Meds   Med Hx  Surg Hx   Fam Hx  Soc Hx        No data recorded       Past Medical History:   Diagnosis Date    Arthritis     Calcaneal spur, unspecified foot     Heel spur    Consumes two servings of caffeine per day     Endothelial corneal dystrophy, unspecified eye 05/07/2018    Corneal guttata    GERD (gastroesophageal reflux disease)     Hyperlipidemia     Migraine headache     PONV (postoperative nausea and vomiting)     Seasonal allergies     Sleep apnea     Spinal stenosis, lumbar region, without neurogenic claudication 11/28/2006    Unspecified lump in unspecified breast     Breast nodule    Wears glasses       Past Surgical History:   Procedure Laterality Date    ACHILLES TENDON SURGERY Left 02/16/2024    BREAST BIOPSY Right 1992    benign x2    COLONOSCOPY      GLAUCOMA SURGERY Bilateral     by Dr. Juan Soto    PLANTAR FASCIA RELEASE Right 1999    TOTAL KNEE  ARTHROPLASTY Left     partial 2011  and right total 2018    WISDOM TOOTH EXTRACTION       Social History     Tobacco Use    Smoking status: Never    Smokeless tobacco: Never    Tobacco comments:     No history of anesthesia reactions. No family history of MH.     No illnesses in the past 30 days.     Does not get SOB with a flight of stairs.     Does not use a cane, walker, or wheelchair.     Is able to lie flat for 30 minutes.     Vaping Use    Vaping status: Never Used   Substance Use Topics    Alcohol use: Yes     Comment: occasionally, 1x weekly at most    Drug use: Never      Current Outpatient Medications   Medication Instructions    aspirin 325 mg tablet 1 tablet, oral, Daily    carboxymethylcellulose (Refresh Plus) 0.5 % ophthalmic solution 1 drop, Both Eyes, 2 times daily PRN    multivitamin (Daily Multi-Vitamin) tablet 1 tablet, oral, Daily    nabumetone (RELAFEN) 750 mg, oral, 2 times daily PRN    omeprazole (PriLOSEC) 40 mg DR capsule 1 tablet, oral, Daily    tolterodine LA (DETROL LA) 4 mg, oral, Daily, PRN      Allergies   Allergen Reactions    Atorvastatin Drowsiness    Bee Pollen Headache    Bee Venom Protein (Honey Bee) Swelling    Penicillins Hives        Chemistry    Lab Results   Component Value Date/Time     10/13/2023 1126    K 4.5 10/13/2023 1126     10/13/2023 1126    CO2 27 10/13/2023 1126    BUN 13 10/13/2023 1126    CREATININE 0.89 10/13/2023 1126    Lab Results   Component Value Date/Time    CALCIUM 9.5 10/13/2023 1126    ALKPHOS 58 10/13/2023 1126    AST 16 10/13/2023 1126    ALT 13 10/13/2023 1126    BILITOT 0.8 10/13/2023 1126          Lab Results   Component Value Date/Time    WBC 5.2 10/13/2023 1126    HGB 14.5 10/13/2023 1126    HCT 44.8 10/13/2023 1126     10/13/2023 1126         Visit Vitals  OB Status Postmenopausal   Smoking Status Never       Followed by cardiology, Steffanie Trevizo MD   EKG: 10/2023 NSR  ECHO: 8/2022  CONCLUSIONS:   1. Left ventricular  systolic function is normal with a 60% estimated ejection fraction.   2. Spectral Doppler shows an impaired relaxation pattern of left ventricular diastolic filling.   3. There is no evidence of left ventricular hypertrophy.   4. Normal chamber sizes.   5. No significant valvular heart disease noted.   6. RVSP within normal limits.   7. Comparison previous study of 12/2018, no significant change.   8. Clinical correlation is advised.  Nuclear Stress Test: 8/2022  IMPRESSION:  Normal Lexiscan Myoview cardiac perfusion stress test.  No evidence of ischemia or myocardial infarction by perfusion imaging.  Normal left ventricular systolic function, ejection fraction 72%.  Abnormal resting electrocardiogram is noted.  No comparison study was available.  Clinical correlation is advised.     Anesthesia Evaluation      Airway   Mallampati: III  TM distance: >3 FB  Dental      Pulmonary     breath sounds clear to auscultation  Cardiovascular     Rhythm: regular    Neuro/Psych      GI/Hepatic/Renal      Endo/Other    Abdominal   (+) obese                      Physical Exam    Airway  Mallampati: III  TM distance: >3 FB     Cardiovascular   Rhythm: regular     Dental    Pulmonary   Breath sounds clear to auscultation     Abdominal   (+) obese              Anesthesia Plan    History of general anesthesia?: yes  History of complications of general anesthesia?: no    ASA 2     MAC     The patient is not a current smoker.    intravenous induction   Anesthetic plan and risks discussed with patient and spouse.    Plan discussed with CAA and attending.

## 2024-08-21 NOTE — BRIEF OP NOTE
Date: 2024  OR [facility]: [facility]    Name: Tami Faustin, : 1960, Age: 63 y.o., MRN: [id number], Sex: female    Diagnosis  Pre-op Diagnosis      * Combined form of age-related cataract, left eye [H25.812] Post-op Diagnosis     * Combined form of age-related cataract, left eye [H25.812]     Procedures  Cataract extraction with intraocular lens implantation OS  46385 - [place] XCAPSL CTRC RMVL INSJ IO LENS PROSTH W/O ECP      Surgeons      * Jcarlos Rojas - Primary    Resident/Fellow/Other Assistant:  Surgeons and Role:  * No surgeons found with a matching role *    Procedure Summary  Anesthesia: Monitor Anesthesia Care  ASA: II  Anesthesia Staff: Anesthesiologist: Jelena Soto MD  C-AA: SUPRIYA Alaniz  Estimated Blood Loss: <1mL  Intra-op Medications:   Administrations occurring from 1050 to 1130 on 24:   Medication Name Total Dose   balanced salts (BSS) intraocular solution 500 mL   lidocaine (Xylocaine) 10 mg/mL (1 %) injection 1.5 mL   chondroitin sulf-sod hyaluron (Duovisc) intraocular kit 0.55 mL   moxifloxacin (Vigamox) 0.5 % ophthalmic solution 1 drop   triamcinolone acetonide (Kenalog-40) injection 8 mg              Anesthesia Record               Intraprocedure I/O Totals       None           Specimen: No specimens collected     Staff:   Circulator: Bairon Isaac Person: Mehnaz          Findings: cataract in left eye removed, intraocular lens (IOL) in the bag    Complications:  None; patient tolerated the procedure well.     Disposition: PACU - hemodynamically stable.  Condition: stable  Specimens Collected: No specimens collected  Attending Attestation:     Jcarlos Rojas  Phone Number: [phone]

## 2024-08-22 ENCOUNTER — APPOINTMENT (OUTPATIENT)
Dept: OPHTHALMOLOGY | Facility: CLINIC | Age: 64
End: 2024-08-22
Payer: MEDICARE

## 2024-08-22 DIAGNOSIS — H25.812 COMBINED FORM OF AGE-RELATED CATARACT, LEFT EYE: Primary | ICD-10-CM

## 2024-08-22 PROCEDURE — 99024 POSTOP FOLLOW-UP VISIT: CPT | Performed by: OPHTHALMOLOGY

## 2024-08-22 ASSESSMENT — ENCOUNTER SYMPTOMS
RESPIRATORY NEGATIVE: 0
CONSTITUTIONAL NEGATIVE: 0
GASTROINTESTINAL NEGATIVE: 0
CARDIOVASCULAR NEGATIVE: 0
PSYCHIATRIC NEGATIVE: 0
ALLERGIC/IMMUNOLOGIC NEGATIVE: 0
NEUROLOGICAL NEGATIVE: 0
EYES NEGATIVE: 0
ENDOCRINE NEGATIVE: 0
HEMATOLOGIC/LYMPHATIC NEGATIVE: 0
MUSCULOSKELETAL NEGATIVE: 0

## 2024-08-22 ASSESSMENT — VISUAL ACUITY
OS_SC: 20/60
METHOD: SNELLEN - LINEAR

## 2024-08-22 ASSESSMENT — PACHYMETRY
OS_CT(UM): 550
OD_CT(UM): 572

## 2024-08-22 ASSESSMENT — TONOMETRY
OS_IOP_MMHG: 14
IOP_METHOD: TONOPEN

## 2024-08-22 ASSESSMENT — SLIT LAMP EXAM - LIDS
COMMENTS: NORMAL
COMMENTS: NORMAL

## 2024-08-22 ASSESSMENT — EXTERNAL EXAM - LEFT EYE: OS_EXAM: NORMAL

## 2024-08-22 ASSESSMENT — EXTERNAL EXAM - RIGHT EYE: OD_EXAM: NORMAL

## 2024-08-30 ENCOUNTER — APPOINTMENT (OUTPATIENT)
Dept: OPHTHALMOLOGY | Facility: CLINIC | Age: 64
End: 2024-08-30
Payer: MEDICARE

## 2024-08-30 DIAGNOSIS — H18.20 CORNEA EDEMA: Primary | ICD-10-CM

## 2024-08-30 PROCEDURE — 99024 POSTOP FOLLOW-UP VISIT: CPT | Performed by: OPHTHALMOLOGY

## 2024-08-30 ASSESSMENT — EXTERNAL EXAM - RIGHT EYE: OD_EXAM: NORMAL

## 2024-08-30 ASSESSMENT — PACHYMETRY
OS_CT(UM): 550
OD_CT(UM): 572

## 2024-08-30 ASSESSMENT — VISUAL ACUITY
OS_SC: 20/25
OS_SC+: -1
OS_SC: J3 -2
METHOD: SNELLEN - LINEAR

## 2024-08-30 ASSESSMENT — TONOMETRY
IOP_METHOD: TONOPEN
OS_IOP_MMHG: 10

## 2024-08-30 ASSESSMENT — EXTERNAL EXAM - LEFT EYE: OS_EXAM: NORMAL

## 2024-08-30 ASSESSMENT — SLIT LAMP EXAM - LIDS
COMMENTS: NORMAL
COMMENTS: NORMAL

## 2024-09-04 ENCOUNTER — HOSPITAL ENCOUNTER (OUTPATIENT)
Facility: CLINIC | Age: 64
Setting detail: OUTPATIENT SURGERY
Discharge: HOME | End: 2024-09-04
Attending: OPHTHALMOLOGY | Admitting: OPHTHALMOLOGY
Payer: MEDICARE

## 2024-09-04 VITALS
HEART RATE: 65 BPM | TEMPERATURE: 97 F | WEIGHT: 266.76 LBS | RESPIRATION RATE: 16 BRPM | SYSTOLIC BLOOD PRESSURE: 159 MMHG | OXYGEN SATURATION: 99 % | DIASTOLIC BLOOD PRESSURE: 86 MMHG | BODY MASS INDEX: 40.43 KG/M2 | HEIGHT: 68 IN

## 2024-09-04 DIAGNOSIS — H25.811 COMBINED FORM OF AGE-RELATED CATARACT, RIGHT EYE: Primary | ICD-10-CM

## 2024-09-04 PROCEDURE — 66984 XCAPSL CTRC RMVL W/O ECP: CPT | Performed by: OPHTHALMOLOGY

## 2024-09-04 PROCEDURE — 2720000007 HC OR 272 NO HCPCS: Performed by: OPHTHALMOLOGY

## 2024-09-04 PROCEDURE — 7100000010 HC PHASE TWO TIME - EACH INCREMENTAL 1 MINUTE: Performed by: OPHTHALMOLOGY

## 2024-09-04 PROCEDURE — 2500000004 HC RX 250 GENERAL PHARMACY W/ HCPCS (ALT 636 FOR OP/ED): Performed by: OPHTHALMOLOGY

## 2024-09-04 PROCEDURE — 2500000005 HC RX 250 GENERAL PHARMACY W/O HCPCS: Performed by: OPHTHALMOLOGY

## 2024-09-04 PROCEDURE — C1780 LENS, INTRAOCULAR (NEW TECH): HCPCS | Performed by: OPHTHALMOLOGY

## 2024-09-04 PROCEDURE — 3600000008 HC OR TIME - EACH INCREMENTAL 1 MINUTE - PROCEDURE LEVEL THREE: Performed by: OPHTHALMOLOGY

## 2024-09-04 PROCEDURE — 7100000009 HC PHASE TWO TIME - INITIAL BASE CHARGE: Performed by: OPHTHALMOLOGY

## 2024-09-04 PROCEDURE — 3600000003 HC OR TIME - INITIAL BASE CHARGE - PROCEDURE LEVEL THREE: Performed by: OPHTHALMOLOGY

## 2024-09-04 PROCEDURE — 2500000001 HC RX 250 WO HCPCS SELF ADMINISTERED DRUGS (ALT 637 FOR MEDICARE OP): Performed by: OPHTHALMOLOGY

## 2024-09-04 DEVICE — ENVISTA ASPIRE HYDROPHOBIC ACRYLIC IOL (NON-PRELOADED) +24.00D
Type: IMPLANTABLE DEVICE | Site: EYE | Status: FUNCTIONAL
Brand: ENVISTA ASPIRE™ IOL

## 2024-09-04 RX ORDER — OXYCODONE HYDROCHLORIDE 5 MG/1
5 TABLET ORAL EVERY 4 HOURS PRN
Status: DISCONTINUED | OUTPATIENT
Start: 2024-09-04 | End: 2024-09-04 | Stop reason: HOSPADM

## 2024-09-04 RX ORDER — CYCLOPENTOLATE HYDROCHLORIDE 10 MG/ML
1 SOLUTION/ DROPS OPHTHALMIC
Status: COMPLETED | OUTPATIENT
Start: 2024-09-04 | End: 2024-09-04

## 2024-09-04 RX ORDER — ACETAMINOPHEN 325 MG/1
650 TABLET ORAL EVERY 4 HOURS PRN
Status: DISCONTINUED | OUTPATIENT
Start: 2024-09-04 | End: 2024-09-04 | Stop reason: HOSPADM

## 2024-09-04 RX ORDER — PREDNISOLONE ACETATE 10 MG/ML
1 SUSPENSION/ DROPS OPHTHALMIC 4 TIMES DAILY
Qty: 5 ML | Refills: 2 | Status: SHIPPED | OUTPATIENT
Start: 2024-09-04

## 2024-09-04 RX ORDER — MOXIFLOXACIN 5 MG/ML
SOLUTION/ DROPS OPHTHALMIC AS NEEDED
Status: DISCONTINUED | OUTPATIENT
Start: 2024-09-04 | End: 2024-09-04 | Stop reason: HOSPADM

## 2024-09-04 RX ORDER — FENTANYL CITRATE 50 UG/ML
50 INJECTION, SOLUTION INTRAMUSCULAR; INTRAVENOUS EVERY 5 MIN PRN
Status: DISCONTINUED | OUTPATIENT
Start: 2024-09-04 | End: 2024-09-04 | Stop reason: HOSPADM

## 2024-09-04 RX ORDER — TETRACAINE HYDROCHLORIDE 5 MG/ML
1 SOLUTION OPHTHALMIC ONCE
Status: COMPLETED | OUTPATIENT
Start: 2024-09-04 | End: 2024-09-04

## 2024-09-04 RX ORDER — MOXIFLOXACIN 5 MG/ML
1 SOLUTION/ DROPS OPHTHALMIC
Status: COMPLETED | OUTPATIENT
Start: 2024-09-04 | End: 2024-09-04

## 2024-09-04 RX ORDER — PHENYLEPHRINE HYDROCHLORIDE 25 MG/ML
1 SOLUTION/ DROPS OPHTHALMIC
Status: COMPLETED | OUTPATIENT
Start: 2024-09-04 | End: 2024-09-04

## 2024-09-04 RX ORDER — LIDOCAINE IN NACL,ISO-OSMOT/PF 30 MG/3 ML
0.1 SYRINGE (ML) INJECTION ONCE
Status: DISCONTINUED | OUTPATIENT
Start: 2024-09-04 | End: 2024-09-04 | Stop reason: HOSPADM

## 2024-09-04 RX ORDER — SODIUM CHLORIDE, SODIUM LACTATE, POTASSIUM CHLORIDE, CALCIUM CHLORIDE 600; 310; 30; 20 MG/100ML; MG/100ML; MG/100ML; MG/100ML
100 INJECTION, SOLUTION INTRAVENOUS CONTINUOUS
Status: DISCONTINUED | OUTPATIENT
Start: 2024-09-04 | End: 2024-09-04 | Stop reason: HOSPADM

## 2024-09-04 RX ORDER — LIDOCAINE HYDROCHLORIDE 10 MG/ML
INJECTION INFILTRATION; PERINEURAL AS NEEDED
Status: DISCONTINUED | OUTPATIENT
Start: 2024-09-04 | End: 2024-09-04 | Stop reason: HOSPADM

## 2024-09-04 RX ORDER — ONDANSETRON HYDROCHLORIDE 2 MG/ML
4 INJECTION, SOLUTION INTRAVENOUS ONCE AS NEEDED
Status: DISCONTINUED | OUTPATIENT
Start: 2024-09-04 | End: 2024-09-04 | Stop reason: HOSPADM

## 2024-09-04 RX ORDER — EPINEPHRINE 1 MG/ML
INJECTION, SOLUTION, CONCENTRATE INTRAVENOUS AS NEEDED
Status: DISCONTINUED | OUTPATIENT
Start: 2024-09-04 | End: 2024-09-04 | Stop reason: HOSPADM

## 2024-09-04 RX ORDER — TETRACAINE HYDROCHLORIDE 5 MG/ML
SOLUTION OPHTHALMIC AS NEEDED
Status: DISCONTINUED | OUTPATIENT
Start: 2024-09-04 | End: 2024-09-04 | Stop reason: HOSPADM

## 2024-09-04 RX ORDER — TRIAMCINOLONE ACETONIDE 40 MG/ML
INJECTION, SUSPENSION INTRA-ARTICULAR; INTRAMUSCULAR AS NEEDED
Status: DISCONTINUED | OUTPATIENT
Start: 2024-09-04 | End: 2024-09-04 | Stop reason: HOSPADM

## 2024-09-04 RX ORDER — TROPICAMIDE 10 MG/ML
1 SOLUTION/ DROPS OPHTHALMIC
Status: COMPLETED | OUTPATIENT
Start: 2024-09-04 | End: 2024-09-04

## 2024-09-04 RX ORDER — ACETAMINOPHEN 325 MG/1
650 TABLET ORAL ONCE
Status: DISCONTINUED | OUTPATIENT
Start: 2024-09-04 | End: 2024-09-04 | Stop reason: HOSPADM

## 2024-09-04 ASSESSMENT — ENCOUNTER SYMPTOMS
CONSTITUTIONAL NEGATIVE: 1
CARDIOVASCULAR NEGATIVE: 1
RESPIRATORY NEGATIVE: 1

## 2024-09-04 ASSESSMENT — COLUMBIA-SUICIDE SEVERITY RATING SCALE - C-SSRS
6. HAVE YOU EVER DONE ANYTHING, STARTED TO DO ANYTHING, OR PREPARED TO DO ANYTHING TO END YOUR LIFE?: NO
2. HAVE YOU ACTUALLY HAD ANY THOUGHTS OF KILLING YOURSELF?: NO
1. IN THE PAST MONTH, HAVE YOU WISHED YOU WERE DEAD OR WISHED YOU COULD GO TO SLEEP AND NOT WAKE UP?: NO

## 2024-09-04 ASSESSMENT — PAIN SCALES - GENERAL
PAINLEVEL_OUTOF10: 0 - NO PAIN
PAINLEVEL_OUTOF10: 0 - NO PAIN

## 2024-09-04 ASSESSMENT — PAIN - FUNCTIONAL ASSESSMENT
PAIN_FUNCTIONAL_ASSESSMENT: 0-10
PAIN_FUNCTIONAL_ASSESSMENT: 0-10

## 2024-09-04 NOTE — OP NOTE
Cataract extraction with intraocular lens implantation (R) Operative Note     Date: 2024  OR Location: [facility] OR    Name: Tami Faustin, : 1960, Age: 64 y.o., MRN: [id number], Sex: female    Diagnosis  Pre-op Diagnosis      * Combined form of age-related cataract, right eye [H25.811] Post-op Diagnosis     * Combined form of age-related cataract, right eye [H25.811]     Procedures  Cataract extraction with intraocular lens implantation  58164 - [place] XCAPSL CTRC RMVL INSJ IO LENS PROSTH W/O ECP      Surgeons      * Jcarlos Rojas - Primary    Resident/Fellow/Other Assistant:  Surgeons and Role:  * No surgeons found with a matching role *    Procedure Summary  Anesthesia: Anesthesia type not filed in the log.  ASA: ASA status not filed in the log.  Anesthesia Staff: No anesthesia staff entered.  Estimated Blood Loss: 0mL  Intra-op Medications: Administrations occurring from 1240 to 1320 on 24:  * No intraprocedure medications in log *           Anesthesia Record               Intraprocedure I/O Totals       None           Specimen: No specimens collected     Staff:   Circulator: Stacia Isaac Person: Lacy         Drains and/or Catheters: * None in log *    Tourniquet Times:         Implants:  Implants       Type Name Action Serial No.       +24.00D, ENVISTA ASPIRE HYDROPHOBIC ACRYLIC IOL (NON-PRELOADED) Implanted [id number]              Findings: Cataract OD    Indications: Tami Faustin is an 64 y.o. female who is having surgery for Combined form of age-related cataract, right eye [H25.811].     The patient was seen in the preoperative area. The risks, benefits, complications, treatment options, non-operative alternatives, expected recovery and outcomes were discussed with the patient. The possibilities of reaction to medication, pulmonary aspiration, injury to surrounding structures, bleeding, recurrent infection, the need for additional procedures, failure to diagnose a condition, and  creating a complication requiring transfusion or operation were discussed with the patient. The patient concurred with the proposed plan, giving informed consent.  The site of surgery was properly noted/marked if necessary per policy. The patient has been actively warmed in preoperative area. Preoperative antibiotics have been ordered and given within 1 hours of incision. Venous thrombosis prophylaxis are not indicated.    Procedure Details: The patient was placed in the supine position on the operating room table where appropriate blood pressure and cardiac monitoring were initiated. The patient was prepped and draped in the usual sterile fashion for intraocular surgery. This included instillation of Betadine 5% onto the ocular surface followed by irrigation with balance salt solution a minute or two later. A lid speculum was placed and the operating microscope was positioned. One paracentesis stab incision was made to the left of the planned cataract incision with a 15-degree supersharp blade. 1 ml of preservative free lidocaine was injected into the AC. Viscoat was used to replace the aqueous humor. A temporal clear corneal wound was fashioned beginning at the limbus with a 2.2 mm keratome, extending 2 mm into clear cornea before entering the anterior chamber. A continuous tear circular capsulorhexis of approximately 5 mm in diameter was performed. Hydrodissection was performed using a Plummer canula. The endothelium was coated with viscoat again. Using the Ozil handpiece on the Et3arraf Lens Removal System, the nucleus was emulsified and aspirated using a divide-and-conquer technique. Residual cortex was removed from the eye with the irrigation/aspiration bimanually. ProVisc was used to inflate the capsular bag. The lens implant was inspected and found to be free of visible defects. The lens used was model Envista Aspire, power 24.0 diopter intraocular lens. The lens was inserted into the capsular bag. The lens  was centered with a Mao hook. Residual viscoelastic was removed from the eye with the irrigation/aspiration instrument. Preservative free moxifloxacin was injected  intracameral. The wounds were checked and found to be watertight. 0.3ml of Diluted (1:3) triamcinolone acetonide was injected subonj inferiorly. The lid speculum was removed and the eye was dressed with Maxitrol ointment, eye pad, tape, and shield. The patient tolerated the procedure well, and there were no complications.   Complications:  None; patient tolerated the procedure well.    Disposition: PACU - hemodynamically stable.  Condition: stable         Additional Details: None    Attending Attestation: I performed the procedure.    Jcarlos Rojas  Phone Number: [phone]

## 2024-09-04 NOTE — H&P
History Of Present Illness  Tami Faustin is a 63 y.o. female presenting with cataract in left eye here for cataract extraction and intraocular lens (IOL) insertion in right eye.      Past Medical History  Past Medical History:   Diagnosis Date    Arthritis     Calcaneal spur, unspecified foot     Heel spur    Consumes two servings of caffeine per day     Endothelial corneal dystrophy, unspecified eye 05/07/2018    Corneal guttata    GERD (gastroesophageal reflux disease)     Hyperlipidemia     Migraine headache     PONV (postoperative nausea and vomiting)     Seasonal allergies     Spinal stenosis, lumbar region, without neurogenic claudication 11/28/2006    Unspecified lump in unspecified breast     Breast nodule    Wears glasses        Surgical History  Past Surgical History:   Procedure Laterality Date    ACHILLES TENDON SURGERY Left 02/16/2024    BREAST BIOPSY Right 1992    benign x2    COLONOSCOPY      GLAUCOMA SURGERY Bilateral     by Dr. Juan Soto    PLANTAR FASCIA RELEASE Right 1999    TOTAL KNEE ARTHROPLASTY Left     partial 2011  and right total 2018    WISDOM TOOTH EXTRACTION          Social History  She reports that she has never smoked. She has never used smokeless tobacco. She reports current alcohol use. She reports that she does not use drugs.    Family History  Family History   Problem Relation Name Age of Onset    Hypertension Mother      Diabetes Mother      COPD Mother      Heart failure Mother      Hypertension Father      COPD Father      Emphysema Father      Cataracts Father      Cancer Maternal Grandmother      Diabetes Maternal Grandmother      Macular degeneration Maternal Grandmother          Allergies  Atorvastatin, Bee pollen, Bee venom protein (honey bee), and Penicillins    Review of Systems   Constitutional: Negative.    Respiratory: Negative.     Cardiovascular: Negative.         Physical Exam  HENT:      Head: Normocephalic and atraumatic.   Cardiovascular:      Rate and  "Rhythm: Normal rate and regular rhythm.   Pulmonary:      Effort: Pulmonary effort is normal.      Breath sounds: Normal breath sounds.   Neurological:      Mental Status: She is alert.          Last Recorded Vitals  Blood pressure 131/74, pulse 65, temperature 36.5 °C (97.7 °F), resp. rate 16, height 1.727 m (5' 8\"), weight 121 kg (266 lb 12.1 oz), SpO2 95%.    Relevant Results             Assessment/Plan   Assessment & Plan  Combined form of age-related cataract, right eye      Tami Faustin is a 63 y.o. female presenting with cataract in left eye here for cataract extraction and intraocular lens (IOL) insertion in right eye.            Opal Armenta MD    "

## 2024-09-04 NOTE — BRIEF OP NOTE
Date: 2024  OR Location: [facility] OR    Name: Tami Faustin, : 1960, Age: 64 y.o., MRN: [id number], Sex: female    Diagnosis  Pre-op Diagnosis      * Combined form of age-related cataract, right eye [H25.811] Post-op Diagnosis     * Combined form of age-related cataract, right eye [H25.811]     Procedures  Cataract extraction with intraocular lens implantation  61042 - [place] XCAPSL CTRC RMVL INSJ IO LENS PROSTH W/O ECP      Surgeons      * Jcarlos Rojas - Primary    Resident/Fellow/Other Assistant:  Surgeons and Role:  * No surgeons found with a matching role *    Procedure Summary  Anesthesia: Anesthesia type not filed in the log.  ASA: ASA status not filed in the log.  Anesthesia Staff: No anesthesia staff entered.  Estimated Blood Loss: 0mL  Intra-op Medications: Administrations occurring from 1240 to 1320 on 24:  * No intraprocedure medications in log *           Anesthesia Record               Intraprocedure I/O Totals       None           Specimen: No specimens collected     Staff:   Circulator: Stacia Isaac Person: Lacy          Findings: 2+ guttate, 1-2+NS, asteroid hyalosis    Complications:  None; patient tolerated the procedure well.     Disposition: PACU - hemodynamically stable.  Condition: stable  Specimens Collected: No specimens collected  Attending Attestation: I was present and scrubbed for the entire procedure.    Jcarlos Rojas  Phone Number: [phone]

## 2024-09-05 ENCOUNTER — APPOINTMENT (OUTPATIENT)
Dept: OPHTHALMOLOGY | Facility: CLINIC | Age: 64
End: 2024-09-05
Payer: MEDICARE

## 2024-09-05 DIAGNOSIS — Z96.1 PSEUDOPHAKIA OF BOTH EYES: Primary | ICD-10-CM

## 2024-09-05 PROCEDURE — 99024 POSTOP FOLLOW-UP VISIT: CPT | Performed by: OPHTHALMOLOGY

## 2024-09-05 ASSESSMENT — TONOMETRY
IOP_METHOD: GOLDMANN APPLANATION
OD_IOP_MMHG: 17
OS_IOP_MMHG: 11

## 2024-09-05 ASSESSMENT — CONF VISUAL FIELD
OS_INFERIOR_TEMPORAL_RESTRICTION: 0
OD_SUPERIOR_TEMPORAL_RESTRICTION: 0
OS_NORMAL: 1
OD_INFERIOR_TEMPORAL_RESTRICTION: 0
OS_SUPERIOR_TEMPORAL_RESTRICTION: 0
OD_SUPERIOR_NASAL_RESTRICTION: 0
OS_SUPERIOR_NASAL_RESTRICTION: 0
OS_INFERIOR_NASAL_RESTRICTION: 0
METHOD: COUNTING FINGERS
OD_NORMAL: 1
OD_INFERIOR_NASAL_RESTRICTION: 0

## 2024-09-05 ASSESSMENT — ENCOUNTER SYMPTOMS
CONSTITUTIONAL NEGATIVE: 0
PSYCHIATRIC NEGATIVE: 0
ENDOCRINE NEGATIVE: 0
MUSCULOSKELETAL NEGATIVE: 0
ALLERGIC/IMMUNOLOGIC NEGATIVE: 0
GASTROINTESTINAL NEGATIVE: 0
RESPIRATORY NEGATIVE: 0
CARDIOVASCULAR NEGATIVE: 0
HEMATOLOGIC/LYMPHATIC NEGATIVE: 0
EYES NEGATIVE: 1
NEUROLOGICAL NEGATIVE: 0

## 2024-09-05 ASSESSMENT — SLIT LAMP EXAM - LIDS
COMMENTS: NORMAL
COMMENTS: NORMAL

## 2024-09-05 ASSESSMENT — VISUAL ACUITY
OS_SC+: -1
METHOD: SNELLEN - LINEAR
OD_SC: 20/30
OS_SC: 20/25
OD_SC+: -1

## 2024-09-05 ASSESSMENT — EXTERNAL EXAM - RIGHT EYE: OD_EXAM: NORMAL

## 2024-09-05 ASSESSMENT — EXTERNAL EXAM - LEFT EYE: OS_EXAM: NORMAL

## 2024-09-05 ASSESSMENT — PACHYMETRY
OD_CT(UM): 572
OS_CT(UM): 550

## 2024-09-05 NOTE — PROGRESS NOTES
4/7/2017      Michelle Quintana  [address] [place] [id number]        Dear Ms. Qunitana,    This letter confirms that the date and time of your procedure with Dr. Robin Garcia on April 14, 2017 has been rescheduled to April 12, 2017, 11 a.m. at:      Please register at   [facility]  [address] [place], [place]  [phone] on April 12, 2017 at 10 a.m..       Please refer to preoperative instructions in the letter previously sent to you.  If  you have any questions or need to reschedule, please contact me at the telephone number and extension listed below.  Thank you for your flexibility and cooperation.      Sincerely,  Jacki ABAD [phone]  Surgery Scheduler for Dr. Robin Garcia  Pre-Admit Department         POD 1 s/p phaco OD  PF qid and taper  f/u 2 wks  ER precautions   PO instructions

## 2024-09-20 ENCOUNTER — APPOINTMENT (OUTPATIENT)
Dept: OPHTHALMOLOGY | Facility: CLINIC | Age: 64
End: 2024-09-20
Payer: MEDICARE

## 2024-09-27 ENCOUNTER — APPOINTMENT (OUTPATIENT)
Dept: OPHTHALMOLOGY | Facility: CLINIC | Age: 64
End: 2024-09-27
Payer: MEDICARE

## 2024-09-27 DIAGNOSIS — Z96.1 PSEUDOPHAKIA OF BOTH EYES: Primary | ICD-10-CM

## 2024-09-27 PROCEDURE — 99024 POSTOP FOLLOW-UP VISIT: CPT | Performed by: OPHTHALMOLOGY

## 2024-09-27 ASSESSMENT — REFRACTION_MANIFEST
OS_SPHERE: PLANO
OS_ADD: +2.50
OD_ADD: +2.50
OS_CYLINDER: SPHERE
METHOD_AUTOREFRACTION: 1
OD_CYLINDER: -0.75
OD_AXIS: 145
OD_SPHERE: -0.25
OS_CYLINDER: SPHERE
OD_AXIS: 145
OD_CYLINDER: -0.50
OS_SPHERE: PLANO
OD_SPHERE: +0.75

## 2024-09-27 ASSESSMENT — SLIT LAMP EXAM - LIDS
COMMENTS: NORMAL
COMMENTS: NORMAL

## 2024-09-27 ASSESSMENT — ENCOUNTER SYMPTOMS
NEUROLOGICAL NEGATIVE: 0
CARDIOVASCULAR NEGATIVE: 0
GASTROINTESTINAL NEGATIVE: 0
RESPIRATORY NEGATIVE: 0
MUSCULOSKELETAL NEGATIVE: 0
ENDOCRINE NEGATIVE: 0
CONSTITUTIONAL NEGATIVE: 0
EYES NEGATIVE: 1
PSYCHIATRIC NEGATIVE: 0
ALLERGIC/IMMUNOLOGIC NEGATIVE: 0
HEMATOLOGIC/LYMPHATIC NEGATIVE: 0

## 2024-09-27 ASSESSMENT — VISUAL ACUITY
OS_CC+: +1
CORRECTION_TYPE: CONTACTS
OS_CC: 20/30
METHOD: SNELLEN - LINEAR
OD_SC: 20/25
OD_SC+: -2

## 2024-09-27 ASSESSMENT — TONOMETRY
OS_IOP_MMHG: 13
OD_IOP_MMHG: 14
IOP_METHOD: TONOPEN

## 2024-09-27 ASSESSMENT — CONF VISUAL FIELD
OD_INFERIOR_NASAL_RESTRICTION: 0
OS_INFERIOR_NASAL_RESTRICTION: 0
OS_SUPERIOR_TEMPORAL_RESTRICTION: 0
OD_NORMAL: 1
OS_INFERIOR_TEMPORAL_RESTRICTION: 0
OD_SUPERIOR_TEMPORAL_RESTRICTION: 0
OD_SUPERIOR_NASAL_RESTRICTION: 0
OD_INFERIOR_TEMPORAL_RESTRICTION: 0
OS_SUPERIOR_NASAL_RESTRICTION: 0
OS_NORMAL: 1

## 2024-09-27 ASSESSMENT — EXTERNAL EXAM - RIGHT EYE: OD_EXAM: NORMAL

## 2024-09-27 ASSESSMENT — PACHYMETRY
OD_CT(UM): 572
OS_CT(UM): 550

## 2024-09-27 ASSESSMENT — EXTERNAL EXAM - LEFT EYE: OS_EXAM: NORMAL

## 2024-09-27 NOTE — PROGRESS NOTES
POW 3 s/p phaco OD  PF qday then stop  Pt feels vision is not very focused  Explained Fuchs is contributing to her vision not being very focused and also OD is aimed for -0.50 in case she needs a DMEK in the future  Explained vision will slowly improve over the next 1-2 months  6 months with me for pentacam

## 2024-10-15 ENCOUNTER — OFFICE VISIT (OUTPATIENT)
Dept: OPHTHALMOLOGY | Facility: CLINIC | Age: 64
End: 2024-10-15

## 2024-10-15 ENCOUNTER — APPOINTMENT (OUTPATIENT)
Dept: OPHTHALMOLOGY | Facility: CLINIC | Age: 64
End: 2024-10-15

## 2024-10-15 DIAGNOSIS — H04.123 DRY EYE SYNDROME, BILATERAL: ICD-10-CM

## 2024-10-15 DIAGNOSIS — Z96.1 PSEUDOPHAKIA OF BOTH EYES: Primary | ICD-10-CM

## 2024-10-15 DIAGNOSIS — H18.513 CORNEAL GUTTATA OF BOTH EYES: ICD-10-CM

## 2024-10-15 DIAGNOSIS — H18.20 CORNEA EDEMA: ICD-10-CM

## 2024-10-15 LAB
CENTRAL CORNEA THICKNESS (OD): 585 MICRONS
CENTRAL CORNEA THICKNESS (OS): 573 MICRONS

## 2024-10-15 PROCEDURE — 99024 POSTOP FOLLOW-UP VISIT: CPT | Performed by: OPHTHALMOLOGY

## 2024-10-15 PROCEDURE — 92025 CPTRIZED CORNEAL TOPOGRAPHY: CPT | Performed by: OPHTHALMOLOGY

## 2024-10-15 ASSESSMENT — TONOMETRY
OD_IOP_MMHG: 14
OS_IOP_MMHG: 14
IOP_METHOD: GOLDMANN APPLANATION

## 2024-10-15 ASSESSMENT — REFRACTION_WEARINGRX
OD_SPHERE: -0.25
OD_CYLINDER: -0.50
OS_CYLINDER: SPHERE
OD_AXIS: 145
OS_SPHERE: PLANO

## 2024-10-15 ASSESSMENT — CONF VISUAL FIELD
OS_NORMAL: 1
OD_INFERIOR_TEMPORAL_RESTRICTION: 0
OD_NORMAL: 1
OD_SUPERIOR_NASAL_RESTRICTION: 0
OD_INFERIOR_NASAL_RESTRICTION: 0
OS_INFERIOR_TEMPORAL_RESTRICTION: 0
OS_INFERIOR_NASAL_RESTRICTION: 0
OD_SUPERIOR_TEMPORAL_RESTRICTION: 0
OS_SUPERIOR_NASAL_RESTRICTION: 0
OS_SUPERIOR_TEMPORAL_RESTRICTION: 0
METHOD: COUNTING FINGERS

## 2024-10-15 ASSESSMENT — PACHYMETRY
OD_CT(UM): 572
OS_CT(UM): 550

## 2024-10-15 ASSESSMENT — VISUAL ACUITY
OD_SC+: -1
OD_SC: 20/30
OS_SC+: -2
METHOD: SNELLEN - LINEAR
OS_SC: 20/25

## 2024-10-15 ASSESSMENT — ENCOUNTER SYMPTOMS
PSYCHIATRIC NEGATIVE: 0
ALLERGIC/IMMUNOLOGIC NEGATIVE: 0
CARDIOVASCULAR NEGATIVE: 0
ENDOCRINE NEGATIVE: 0
MUSCULOSKELETAL NEGATIVE: 0
GASTROINTESTINAL NEGATIVE: 0
RESPIRATORY NEGATIVE: 0
EYES NEGATIVE: 0
CONSTITUTIONAL NEGATIVE: 0
HEMATOLOGIC/LYMPHATIC NEGATIVE: 0
NEUROLOGICAL NEGATIVE: 0

## 2024-10-15 ASSESSMENT — EXTERNAL EXAM - RIGHT EYE: OD_EXAM: NORMAL

## 2024-10-15 ASSESSMENT — SLIT LAMP EXAM - LIDS
COMMENTS: NORMAL
COMMENTS: NORMAL

## 2024-10-15 ASSESSMENT — EXTERNAL EXAM - LEFT EYE: OS_EXAM: NORMAL

## 2024-10-15 NOTE — PROGRESS NOTES
Assessment/Plan   Diagnoses and all orders for this visit:  Pseudophakia of both eyes  Discussed with pt IC-8 IOL OS would help with her distance and near vision but it works in conjunction with the other eye. This IOL helps give her better range of focus for reading but if she is going to be reading for a long period of time, then she will need reading glasses.    Cornea edema  Corneal guttata of both eyes  -     Corneal Topography - OU - Both Eyes  No edema seen on exam but CCT is thicker c/w preop  Monitor     Dry eye syndrome, bilateral  Pt is overusing preserved ATs (8-12 x/day)  Recommend using PFATs 3-4 x/day  Warm compresses using Breuder mask  Gave the pt also a sample of veyve to use bid    1 month for repeat pentacam, mrx and DFE

## 2024-10-17 ENCOUNTER — LAB (OUTPATIENT)
Dept: LAB | Facility: LAB | Age: 64
End: 2024-10-17

## 2024-10-17 DIAGNOSIS — E78.2 MIXED HYPERLIPIDEMIA: ICD-10-CM

## 2024-10-17 LAB
CHOLEST SERPL-MCNC: 200 MG/DL (ref 0–199)
CHOLESTEROL/HDL RATIO: 3.9
HDLC SERPL-MCNC: 50.8 MG/DL
LDLC SERPL CALC-MCNC: 125 MG/DL
NON HDL CHOLESTEROL: 149 MG/DL (ref 0–149)
TRIGL SERPL-MCNC: 119 MG/DL (ref 0–149)
VLDL: 24 MG/DL (ref 0–40)

## 2024-10-17 PROCEDURE — 36415 COLL VENOUS BLD VENIPUNCTURE: CPT

## 2024-10-17 PROCEDURE — 80061 LIPID PANEL: CPT

## 2024-11-14 ENCOUNTER — APPOINTMENT (OUTPATIENT)
Dept: CARDIOLOGY | Facility: CLINIC | Age: 64
End: 2024-11-14
Payer: MEDICARE

## 2024-11-14 VITALS
SYSTOLIC BLOOD PRESSURE: 120 MMHG | DIASTOLIC BLOOD PRESSURE: 80 MMHG | HEIGHT: 69 IN | BODY MASS INDEX: 38.82 KG/M2 | HEART RATE: 88 BPM | WEIGHT: 262.1 LBS

## 2024-11-14 DIAGNOSIS — E78.2 MIXED HYPERLIPIDEMIA: ICD-10-CM

## 2024-11-14 DIAGNOSIS — Z78.9 NEVER SMOKED CIGARETTES: ICD-10-CM

## 2024-11-14 PROCEDURE — 3008F BODY MASS INDEX DOCD: CPT | Performed by: INTERNAL MEDICINE

## 2024-11-14 PROCEDURE — 1159F MED LIST DOCD IN RCRD: CPT | Performed by: INTERNAL MEDICINE

## 2024-11-14 PROCEDURE — 1036F TOBACCO NON-USER: CPT | Performed by: INTERNAL MEDICINE

## 2024-11-14 PROCEDURE — 99214 OFFICE O/P EST MOD 30 MIN: CPT | Performed by: INTERNAL MEDICINE

## 2024-11-14 RX ORDER — METFORMIN HYDROCHLORIDE 500 MG/1
500 TABLET, EXTENDED RELEASE ORAL NIGHTLY
COMMUNITY
Start: 2024-10-17

## 2024-11-14 RX ORDER — EZETIMIBE 10 MG/1
10 TABLET ORAL DAILY
Qty: 90 TABLET | Refills: 3 | Status: SHIPPED | OUTPATIENT
Start: 2024-11-14 | End: 2025-11-14

## 2024-11-14 NOTE — PATIENT INSTRUCTIONS
START Zetia 10 mg once a day  Cholesterol lab work to be done in 4-6 weeks  Will call patient with test results once reviewed by physician    Cholesterol labs to be done again in 1 year  Follow up office visit in 1 year.    Continue same medications/treatment.  Patient educated on proper medication use.  Patient educated on risk factor modification.  Please bring any lab results from other providers / physicians to your next appointment.    Please bring all medicines, vitamins and herbal supplements with you when you come to the office.    Prescriptions will not be filled unless you are compliant with your follow up appointments or have a follow up  appointment scheduled as per instruction of your physician.  Refills should be requested at the time of  Your visit.    Coco SINCLAIR LPN, am scribing for and in the presence of  Dr. Steffanie Trevizo MD, FACC

## 2024-11-14 NOTE — PROGRESS NOTES
Referred by Dr. Connell ref. provider found provider found for No chief complaint on file.       History of Present Illness  Tami Faustin is a 64 y.o. year old female patient here for follow-up.  Doing well from a cardiac standpoint no complaint no symptoms of chest pain or shortness of breath however her cholesterol was elevated she could not tolerate statin therapy.  Had a lengthy discussion with the patient there is we need a better cholesterol management.  We will add Zetia daily 10 mg on a daily basis.  Will repeat lipid panel in about 4 to 6 weeks.  Will follow-up as scheduled    Past Medical History  Past Medical History:   Diagnosis Date    Arthritis     Calcaneal spur, unspecified foot     Heel spur    Consumes two servings of caffeine per day     Endothelial corneal dystrophy, unspecified eye 05/07/2018    Corneal guttata    GERD (gastroesophageal reflux disease)     Hyperlipidemia     Migraine headache     PONV (postoperative nausea and vomiting)     Seasonal allergies     Spinal stenosis, lumbar region, without neurogenic claudication 11/28/2006    Unspecified lump in unspecified breast     Breast nodule    Wears glasses        Social History  Social History     Tobacco Use    Smoking status: Never    Smokeless tobacco: Never    Tobacco comments:     No history of anesthesia reactions. No family history of MH.     No illnesses in the past 30 days.     Does not get SOB with a flight of stairs.     Does not use a cane, walker, or wheelchair.     Is able to lie flat for 30 minutes.     Vaping Use    Vaping status: Never Used   Substance Use Topics    Alcohol use: Yes     Comment: occasionally, 1x weekly at most    Drug use: Never       Family History     Family History   Problem Relation Name Age of Onset    Hypertension Mother      Diabetes Mother      COPD Mother      Heart failure Mother      Hypertension Father      COPD Father      Emphysema Father      Cataracts Father      Cancer Maternal Grandmother       Diabetes Maternal Grandmother      Macular degeneration Maternal Grandmother         Review of Systems  As per HPI, all other systems reviewed and negative.    Allergies:  Allergies   Allergen Reactions    Atorvastatin Drowsiness    Bee Pollen Headache    Bee Venom Protein (Honey Bee) Swelling    Penicillins Hives        Outpatient Medications:  Current Outpatient Medications   Medication Instructions    aspirin 325 mg tablet 1 tablet, oral, Daily    aspirin 81 mg, oral, Daily    carboxymethylcellulose (Refresh Plus) 0.5 % ophthalmic solution 1 drop, Both Eyes, 2 times daily PRN    diphenhydrAMINE (SOMINEX) 25 mg, oral, Daily PRN    multivitamin (Daily Multi-Vitamin) tablet 1 tablet, oral, Daily    omeprazole (PriLOSEC) 40 mg DR capsule 1 tablet, oral, Daily    prednisoLONE acetate (Pred Forte) 1 % ophthalmic suspension 1 drop, Left Eye, 4 times daily    prednisoLONE acetate (Pred-Forte) 1 % ophthalmic suspension 1 drop, Right Eye, 4 times daily    tolterodine LA (DETROL LA) 4 mg, oral, Daily, PRN         Vitals:  There were no vitals filed for this visit.    Physical Exam:  Physical Exam  Vitals and nursing note reviewed.   Constitutional:       Appearance: Normal appearance.   HENT:      Head: Normocephalic.   Eyes:      Pupils: Pupils are equal, round, and reactive to light.   Cardiovascular:      Rate and Rhythm: Normal rate and regular rhythm.      Pulses: Normal pulses.      Heart sounds: Normal heart sounds.   Pulmonary:      Effort: Pulmonary effort is normal.      Breath sounds: Normal breath sounds.   Musculoskeletal:         General: Normal range of motion.      Cervical back: Normal range of motion.   Skin:     General: Skin is dry.   Neurological:      General: No focal deficit present.      Mental Status: She is alert and oriented to person, place, and time.   Psychiatric:         Behavior: Behavior is cooperative.             Assessment/Plan   Diagnoses and all orders for this visit:  Mixed  hyperlipidemia  Never smoked cigarettes          Steffanie Trevizo MD [facility]  Interventional Cardiology   of [facility]     Thank you for allowing me to participate in the care of this patient. Please do not hesitate to contact me with any further questions or concerns.

## 2024-11-19 ENCOUNTER — APPOINTMENT (OUTPATIENT)
Dept: OPHTHALMOLOGY | Facility: CLINIC | Age: 64
End: 2024-11-19

## 2024-11-19 DIAGNOSIS — H59.099 ANTERIOR CAPSULAR PHIMOSIS OF EYE: ICD-10-CM

## 2024-11-19 DIAGNOSIS — H18.513 CORNEAL GUTTATA OF BOTH EYES: ICD-10-CM

## 2024-11-19 DIAGNOSIS — H53.2 MONOCULAR DIPLOPIA: ICD-10-CM

## 2024-11-19 DIAGNOSIS — H18.20 CORNEA EDEMA: Primary | ICD-10-CM

## 2024-11-19 LAB
CENTRAL CORNEA THICKNESS (OD): 556 MICRONS
CENTRAL CORNEA THICKNESS (OS): 558 MICRONS

## 2024-11-19 PROCEDURE — 99024 POSTOP FOLLOW-UP VISIT: CPT | Performed by: OPHTHALMOLOGY

## 2024-11-19 PROCEDURE — 92025 CPTRIZED CORNEAL TOPOGRAPHY: CPT | Performed by: OPHTHALMOLOGY

## 2024-11-19 ASSESSMENT — REFRACTION_MANIFEST
OD_SPHERE: -0.25
OS_AXIS: 175
OD_AXIS: 140
METHOD_AUTOREFRACTION: 1
OD_CYLINDER: -0.50
OS_SPHERE: -0.50
OS_CYLINDER: -1.00
OS_SPHERE: UTO
OD_CYLINDER: -0.50
OD_AXIS: 140
OS_ADD: +2.50
OD_ADD: +2.50
OD_SPHERE: -0.25

## 2024-11-19 ASSESSMENT — CONF VISUAL FIELD
OS_NORMAL: 1
OD_SUPERIOR_TEMPORAL_RESTRICTION: 0
OS_SUPERIOR_TEMPORAL_RESTRICTION: 0
OS_INFERIOR_TEMPORAL_RESTRICTION: 0
OD_NORMAL: 1
OD_INFERIOR_NASAL_RESTRICTION: 0
OD_INFERIOR_TEMPORAL_RESTRICTION: 0
OS_SUPERIOR_NASAL_RESTRICTION: 0
OS_INFERIOR_NASAL_RESTRICTION: 0
OD_SUPERIOR_NASAL_RESTRICTION: 0

## 2024-11-19 ASSESSMENT — SLIT LAMP EXAM - LIDS
COMMENTS: NORMAL
COMMENTS: NORMAL

## 2024-11-19 ASSESSMENT — CUP TO DISC RATIO
OD_RATIO: 0.2
OS_RATIO: 0.2

## 2024-11-19 ASSESSMENT — VISUAL ACUITY
OD_SC: J7
OS_SC: 20/40
OS_SC: J2
OD_SC: 20/40-2
METHOD: SNELLEN - LINEAR

## 2024-11-19 ASSESSMENT — TONOMETRY
OS_IOP_MMHG: 13
IOP_METHOD: TONOPEN
OD_IOP_MMHG: 14

## 2024-11-19 ASSESSMENT — PACHYMETRY
OD_CT(UM): 572
OS_CT(UM): 550

## 2024-11-19 ASSESSMENT — EXTERNAL EXAM - RIGHT EYE: OD_EXAM: NORMAL

## 2024-11-19 ASSESSMENT — EXTERNAL EXAM - LEFT EYE: OS_EXAM: NORMAL

## 2024-11-19 ASSESSMENT — ENCOUNTER SYMPTOMS: EYES NEGATIVE: 1

## 2024-11-19 NOTE — PROGRESS NOTES
POM 3 s/p phaco OS (IC-8 IOL) and POM 2.5 s/p phaco OD (DIB00)  Pt c/o monocular diplopia OS  There is some mild anterior capsular phimosis OS  Discussed with pt opions including mrx, excision of opacified anterior capsule vs IOLX  Discussed r/b/a of surgery  Pt would like to be as conservative as possible and does not feel her sxs are disabling  MRX released to the pt per their request    6 months

## 2024-12-16 ENCOUNTER — HOSPITAL ENCOUNTER (OUTPATIENT)
Dept: RADIOLOGY | Facility: HOSPITAL | Age: 64
Discharge: HOME | End: 2024-12-16
Payer: MEDICARE

## 2024-12-16 VITALS — WEIGHT: 264 LBS | HEIGHT: 68 IN | BODY MASS INDEX: 40.01 KG/M2

## 2024-12-16 DIAGNOSIS — Z12.31 ENCOUNTER FOR SCREENING MAMMOGRAM FOR MALIGNANT NEOPLASM OF BREAST: ICD-10-CM

## 2024-12-16 PROCEDURE — 77063 BREAST TOMOSYNTHESIS BI: CPT | Performed by: RADIOLOGY

## 2024-12-16 PROCEDURE — 77067 SCR MAMMO BI INCL CAD: CPT | Performed by: RADIOLOGY

## 2024-12-16 PROCEDURE — 77063 BREAST TOMOSYNTHESIS BI: CPT

## 2025-03-28 ENCOUNTER — APPOINTMENT (OUTPATIENT)
Dept: OPHTHALMOLOGY | Facility: CLINIC | Age: 65
End: 2025-03-28
Payer: MEDICARE

## 2025-04-22 ENCOUNTER — APPOINTMENT (OUTPATIENT)
Dept: OPHTHALMOLOGY | Facility: CLINIC | Age: 65
End: 2025-04-22
Payer: MEDICARE

## 2025-04-22 DIAGNOSIS — H18.513 CORNEAL GUTTATA OF BOTH EYES: ICD-10-CM

## 2025-04-22 DIAGNOSIS — T85.29XD MECHANICAL COMPLICATION DUE TO INTRAOCULAR LENS IMPLANT, SUBSEQUENT ENCOUNTER: Primary | ICD-10-CM

## 2025-04-22 PROBLEM — T85.29XA: Status: ACTIVE | Noted: 2025-04-22

## 2025-04-22 PROCEDURE — 99214 OFFICE O/P EST MOD 30 MIN: CPT | Performed by: OPHTHALMOLOGY

## 2025-04-22 RX ORDER — TETRACAINE HYDROCHLORIDE 5 MG/ML
1 SOLUTION OPHTHALMIC ONCE
OUTPATIENT
Start: 2025-04-22 | End: 2025-04-22

## 2025-04-22 RX ORDER — TROPICAMIDE 10 MG/ML
1 SOLUTION/ DROPS OPHTHALMIC
OUTPATIENT
Start: 2025-04-22 | End: 2025-04-22

## 2025-04-22 RX ORDER — CYCLOPENTOLATE HYDROCHLORIDE 10 MG/ML
1 SOLUTION/ DROPS OPHTHALMIC
OUTPATIENT
Start: 2025-04-22 | End: 2025-04-22

## 2025-04-22 RX ORDER — PHENYLEPHRINE HYDROCHLORIDE 25 MG/ML
1 SOLUTION/ DROPS OPHTHALMIC
OUTPATIENT
Start: 2025-04-22 | End: 2025-04-22

## 2025-04-22 RX ORDER — MOXIFLOXACIN 5 MG/ML
1 SOLUTION/ DROPS OPHTHALMIC
OUTPATIENT
Start: 2025-04-22 | End: 2025-04-22

## 2025-04-22 ASSESSMENT — REFRACTION_WEARINGRX
OD_ADD: +2.50
OS_SPHERE: -0.50
OD_CYLINDER: -1.00
OS_CYLINDER: -1.75
OD_SPHERE: -0.25
SPECS_TYPE: BIFOCAL
OS_ADD: +2.50
OS_AXIS: 015
OD_AXIS: 155

## 2025-04-22 ASSESSMENT — VISUAL ACUITY
METHOD: ETDRS
OS_CC: J2
CORRECTION_TYPE: GLASSES
OD_CC: 20/40
OD_CC: J2

## 2025-04-22 ASSESSMENT — EXTERNAL EXAM - LEFT EYE: OS_EXAM: NORMAL

## 2025-04-22 ASSESSMENT — PACHYMETRY
OS_CT(UM): 550
OD_CT(UM): 572

## 2025-04-22 ASSESSMENT — EXTERNAL EXAM - RIGHT EYE: OD_EXAM: NORMAL

## 2025-04-22 ASSESSMENT — SLIT LAMP EXAM - LIDS
COMMENTS: NORMAL
COMMENTS: NORMAL

## 2025-04-22 ASSESSMENT — TONOMETRY
OD_IOP_MMHG: 14
IOP_METHOD: TONOPEN
OS_IOP_MMHG: 15

## 2025-04-22 ASSESSMENT — CUP TO DISC RATIO: OS_RATIO: 0.2

## 2025-04-22 ASSESSMENT — ENCOUNTER SYMPTOMS: EYES NEGATIVE: 1

## 2025-04-22 NOTE — PROGRESS NOTES
Assessment/Plan   Diagnoses and all orders for this visit:  Mechanical complication due to intraocular lens implant, subsequent encounter  Pt still symptomatic of monocular diplopia OS  Not improving with mrx  Discussed IOLX OS  Discussed r/b/a of surgery including zonular damage, PC tear  She understands and wishes to proceed     Corneal guttata of both eyes  Patient advised that the cells on the back surface of the cornea function as little pumps to regulate the amount of fluid in the cornea.  If too many of these cells are lost, the cornea takes on excess fluid and becomes cloudy, making the vision blurry.  Mild cases may be treated with saline drops to help draw out the excess fluid.  Severe cases require a corneal transplant.   Discussed with pt higher likelihood of needing EK after IOLX than the general population due FECD. Also, explained delayed vision recovery due to early k edema. The patient understands.    Agreed to defer EK for now and reassess after IOLX.

## 2025-04-22 NOTE — Clinical Note
April 22, 2025    No Recipients     Patient: Tami Faustin   YOB: 1960   Date of Visit: 4/22/2025       Dear Dr. Connell Recipients:    Thank you for referring Tami Faustin to me for evaluation. Here is my assessment and plan of care:    Assessment/Plan:  Diagnoses and all orders for this visit:  Mechanical complication due to intraocular lens implant, subsequent encounter (Primary)  -     Case Request Operating Room: Intraocular lens exchange; Standing  -     Case Request Operating Room: Intraocular lens exchange  Corneal guttata of both eyes  Other orders  -     Place in outpatient/hospital ambulatory surgery; Standing  -     NPO Diet Except: Sips with meds; Effective now; Standing  -     Height and weight; Standing  -     Insert and maintain peripheral IV; Standing  -     Saline lock IV; Standing  -     Type And Screen; Standing  -     tetracaine (Altacaine) 0.5 % ophthalmic solution 1 drop  -     moxifloxacin (Vigamox) 0.5 % ophthalmic solution 1 drop  -     cyclopentolate (Cyclogyl) 1 % ophthalmic solution 1 drop  -     phenylephrine (Mydfrin) 2.5 % ophthalmic solution 1 drop  -     tropicamide (Mydriacyl) 1 % ophthalmic solution 1 drop    Below you will find my full exam findings. If you have questions, please do not hesitate to call me. I look forward to following Tami along with you.         Sincerely,        Jcarlos Rojas MD        CC:   No Recipients        Base Eye Exam     Visual Acuity (ETDRS)       Right Left    Dist sc 20/50P 20/50 (DIPLOPIA)    Dist cc 20/40 20/40-1 (    Near cc J2 J2    Correction: Glasses          Tonometry (Tonopen, 10:00 AM)       Right Left    Pressure 14 15          Pupils       Pupils    Right PERRL, No APD    Left PERRL, No APD          Neuro/Psych     Oriented x3: Yes    Mood/Affect: Normal            Slit Lamp and Fundus Exam     External Exam       Right Left    External Normal Normal          Slit Lamp Exam       Right Left    Lids/Lashes Normal Normal     Conjunctiva/Sclera White and quiet White and quiet    Cornea 2+ Guttata 2+ Guttata    Anterior Chamber Deep and quiet Deep and quiet    Iris Round and reactive, LPI at 12, patent Round and reactive, LPI at 12, patent    Lens PCIOL IC-8 IOL slightly inferior to pupil center, slight phimosis    Anterior Vitreous asteroid Normal          Fundus Exam       Right Left    Disc  Normal    C/D Ratio  0.2    Macula  Normal    Vessels  Normal    Periphery  Normal            Refraction     Wearing Rx       Sphere Cylinder Axis Add    Right -0.25 -1.00 155 +2.50    Left -0.50 -1.75 015 +2.50    Type: Bifocal

## 2025-05-06 ENCOUNTER — APPOINTMENT (OUTPATIENT)
Dept: OPHTHALMOLOGY | Facility: CLINIC | Age: 65
End: 2025-05-06
Payer: MEDICARE

## 2025-05-09 ENCOUNTER — APPOINTMENT (OUTPATIENT)
Dept: OPHTHALMOLOGY | Facility: CLINIC | Age: 65
End: 2025-05-09
Payer: MEDICARE

## 2025-07-10 ENCOUNTER — APPOINTMENT (OUTPATIENT)
Dept: OPHTHALMOLOGY | Facility: CLINIC | Age: 65
End: 2025-07-10
Payer: MEDICARE

## 2025-08-05 ENCOUNTER — ANESTHESIA EVENT (OUTPATIENT)
Dept: OPERATING ROOM | Facility: CLINIC | Age: 65
End: 2025-08-05
Payer: MEDICARE

## 2025-08-05 RX ORDER — OXYCODONE HYDROCHLORIDE 5 MG/1
10 TABLET ORAL EVERY 4 HOURS PRN
Refills: 0 | Status: CANCELLED | OUTPATIENT
Start: 2025-08-05

## 2025-08-05 RX ORDER — LIDOCAINE HYDROCHLORIDE 10 MG/ML
0.1 INJECTION, SOLUTION EPIDURAL; INFILTRATION; INTRACAUDAL; PERINEURAL ONCE
Status: CANCELLED | OUTPATIENT
Start: 2025-08-05 | End: 2025-08-05

## 2025-08-05 RX ORDER — SODIUM CHLORIDE, SODIUM LACTATE, POTASSIUM CHLORIDE, CALCIUM CHLORIDE 600; 310; 30; 20 MG/100ML; MG/100ML; MG/100ML; MG/100ML
100 INJECTION, SOLUTION INTRAVENOUS CONTINUOUS
Status: CANCELLED | OUTPATIENT
Start: 2025-08-05 | End: 2025-08-06

## 2025-08-05 RX ORDER — ACETAMINOPHEN 325 MG/1
650 TABLET ORAL EVERY 4 HOURS PRN
Status: CANCELLED | OUTPATIENT
Start: 2025-08-05

## 2025-08-05 RX ORDER — PROCHLORPERAZINE EDISYLATE 5 MG/ML
5 INJECTION INTRAMUSCULAR; INTRAVENOUS ONCE AS NEEDED
Status: CANCELLED | OUTPATIENT
Start: 2025-08-05

## 2025-08-05 RX ORDER — HYDROMORPHONE HYDROCHLORIDE 1 MG/ML
0.25 INJECTION, SOLUTION INTRAMUSCULAR; INTRAVENOUS; SUBCUTANEOUS EVERY 5 MIN PRN
Status: CANCELLED | OUTPATIENT
Start: 2025-08-05

## 2025-08-05 RX ORDER — OXYCODONE HYDROCHLORIDE 5 MG/1
5 TABLET ORAL EVERY 4 HOURS PRN
Refills: 0 | Status: CANCELLED | OUTPATIENT
Start: 2025-08-05

## 2025-08-05 RX ORDER — ALBUTEROL SULFATE 0.83 MG/ML
2.5 SOLUTION RESPIRATORY (INHALATION) ONCE AS NEEDED
Status: CANCELLED | OUTPATIENT
Start: 2025-08-05

## 2025-08-05 RX ORDER — HYDROMORPHONE HYDROCHLORIDE 1 MG/ML
0.5 INJECTION, SOLUTION INTRAMUSCULAR; INTRAVENOUS; SUBCUTANEOUS EVERY 5 MIN PRN
Status: CANCELLED | OUTPATIENT
Start: 2025-08-05

## 2025-08-05 RX ORDER — ONDANSETRON HYDROCHLORIDE 2 MG/ML
4 INJECTION, SOLUTION INTRAVENOUS ONCE AS NEEDED
Status: CANCELLED | OUTPATIENT
Start: 2025-08-05

## 2025-08-05 RX ORDER — HYDRALAZINE HYDROCHLORIDE 20 MG/ML
5 INJECTION INTRAMUSCULAR; INTRAVENOUS EVERY 30 MIN PRN
Status: CANCELLED | OUTPATIENT
Start: 2025-08-05

## 2025-08-05 RX ORDER — LABETALOL HYDROCHLORIDE 5 MG/ML
5 INJECTION, SOLUTION INTRAVENOUS ONCE AS NEEDED
Status: CANCELLED | OUTPATIENT
Start: 2025-08-05

## 2025-08-06 ENCOUNTER — ANESTHESIA (OUTPATIENT)
Dept: OPERATING ROOM | Facility: CLINIC | Age: 65
End: 2025-08-06
Payer: MEDICARE

## 2025-08-06 ENCOUNTER — HOSPITAL ENCOUNTER (OUTPATIENT)
Facility: CLINIC | Age: 65
Setting detail: OUTPATIENT SURGERY
Discharge: HOME | End: 2025-08-06
Attending: OPHTHALMOLOGY | Admitting: OPHTHALMOLOGY
Payer: MEDICARE

## 2025-08-06 VITALS
HEART RATE: 77 BPM | WEIGHT: 248.24 LBS | BODY MASS INDEX: 37.62 KG/M2 | DIASTOLIC BLOOD PRESSURE: 82 MMHG | RESPIRATION RATE: 16 BRPM | HEIGHT: 68 IN | SYSTOLIC BLOOD PRESSURE: 154 MMHG | TEMPERATURE: 97.9 F | OXYGEN SATURATION: 97 %

## 2025-08-06 DIAGNOSIS — T85.29XD MECHANICAL COMPLICATION DUE TO INTRAOCULAR LENS IMPLANT, SUBSEQUENT ENCOUNTER: Primary | ICD-10-CM

## 2025-08-06 LAB — GLUCOSE BLD MANUAL STRIP-MCNC: 102 MG/DL (ref 74–99)

## 2025-08-06 PROCEDURE — 3600000008 HC OR TIME - EACH INCREMENTAL 1 MINUTE - PROCEDURE LEVEL THREE: Performed by: OPHTHALMOLOGY

## 2025-08-06 PROCEDURE — 2500000004 HC RX 250 GENERAL PHARMACY W/ HCPCS (ALT 636 FOR OP/ED): Performed by: OPHTHALMOLOGY

## 2025-08-06 PROCEDURE — A66986 PR EXCHANGE LENS PROSTHESIS: Performed by: NURSE ANESTHETIST, CERTIFIED REGISTERED

## 2025-08-06 PROCEDURE — 3700000002 HC GENERAL ANESTHESIA TIME - EACH INCREMENTAL 1 MINUTE: Performed by: OPHTHALMOLOGY

## 2025-08-06 PROCEDURE — 82947 ASSAY GLUCOSE BLOOD QUANT: CPT

## 2025-08-06 PROCEDURE — 7100000010 HC PHASE TWO TIME - EACH INCREMENTAL 1 MINUTE: Performed by: OPHTHALMOLOGY

## 2025-08-06 PROCEDURE — 2500000005 HC RX 250 GENERAL PHARMACY W/O HCPCS: Performed by: OPHTHALMOLOGY

## 2025-08-06 PROCEDURE — 2720000007 HC OR 272 NO HCPCS: Performed by: OPHTHALMOLOGY

## 2025-08-06 PROCEDURE — 7100000009 HC PHASE TWO TIME - INITIAL BASE CHARGE: Performed by: OPHTHALMOLOGY

## 2025-08-06 PROCEDURE — A66986 PR EXCHANGE LENS PROSTHESIS: Performed by: ANESTHESIOLOGY

## 2025-08-06 PROCEDURE — 2500000004 HC RX 250 GENERAL PHARMACY W/ HCPCS (ALT 636 FOR OP/ED): Performed by: NURSE ANESTHETIST, CERTIFIED REGISTERED

## 2025-08-06 PROCEDURE — 3600000003 HC OR TIME - INITIAL BASE CHARGE - PROCEDURE LEVEL THREE: Performed by: OPHTHALMOLOGY

## 2025-08-06 PROCEDURE — 3700000001 HC GENERAL ANESTHESIA TIME - INITIAL BASE CHARGE: Performed by: OPHTHALMOLOGY

## 2025-08-06 PROCEDURE — 66986 EXCHANGE LENS PROSTHESIS: CPT | Performed by: OPHTHALMOLOGY

## 2025-08-06 RX ORDER — HYDRALAZINE HYDROCHLORIDE 20 MG/ML
5 INJECTION INTRAMUSCULAR; INTRAVENOUS EVERY 30 MIN PRN
Status: DISCONTINUED | OUTPATIENT
Start: 2025-08-06 | End: 2025-08-06 | Stop reason: HOSPADM

## 2025-08-06 RX ORDER — ACETAMINOPHEN 325 MG/1
650 TABLET ORAL EVERY 4 HOURS PRN
Status: DISCONTINUED | OUTPATIENT
Start: 2025-08-06 | End: 2025-08-06 | Stop reason: HOSPADM

## 2025-08-06 RX ORDER — HYDROMORPHONE HYDROCHLORIDE 1 MG/ML
0.25 INJECTION, SOLUTION INTRAMUSCULAR; INTRAVENOUS; SUBCUTANEOUS EVERY 5 MIN PRN
Status: DISCONTINUED | OUTPATIENT
Start: 2025-08-06 | End: 2025-08-06 | Stop reason: HOSPADM

## 2025-08-06 RX ORDER — TETRACAINE HYDROCHLORIDE 5 MG/ML
1 SOLUTION OPHTHALMIC ONCE
Status: COMPLETED | OUTPATIENT
Start: 2025-08-06 | End: 2025-08-06

## 2025-08-06 RX ORDER — TROPICAMIDE 10 MG/ML
1 SOLUTION/ DROPS OPHTHALMIC
Status: COMPLETED | OUTPATIENT
Start: 2025-08-06 | End: 2025-08-06

## 2025-08-06 RX ORDER — TRIAMCINOLONE ACETONIDE 40 MG/ML
INJECTION, SUSPENSION INTRA-ARTICULAR; INTRAMUSCULAR AS NEEDED
Status: DISCONTINUED | OUTPATIENT
Start: 2025-08-06 | End: 2025-08-06 | Stop reason: HOSPADM

## 2025-08-06 RX ORDER — POVIDONE-IODINE 5 %
SOLUTION, NON-ORAL OPHTHALMIC (EYE) AS NEEDED
Status: DISCONTINUED | OUTPATIENT
Start: 2025-08-06 | End: 2025-08-06 | Stop reason: HOSPADM

## 2025-08-06 RX ORDER — LIDOCAINE HYDROCHLORIDE 10 MG/ML
INJECTION, SOLUTION EPIDURAL; INFILTRATION; INTRACAUDAL; PERINEURAL AS NEEDED
Status: DISCONTINUED | OUTPATIENT
Start: 2025-08-06 | End: 2025-08-06 | Stop reason: HOSPADM

## 2025-08-06 RX ORDER — LIDOCAINE HYDROCHLORIDE 10 MG/ML
0.1 INJECTION, SOLUTION EPIDURAL; INFILTRATION; INTRACAUDAL; PERINEURAL ONCE
Status: DISCONTINUED | OUTPATIENT
Start: 2025-08-06 | End: 2025-08-06 | Stop reason: HOSPADM

## 2025-08-06 RX ORDER — OXYCODONE HYDROCHLORIDE 5 MG/1
5 TABLET ORAL EVERY 4 HOURS PRN
Status: DISCONTINUED | OUTPATIENT
Start: 2025-08-06 | End: 2025-08-06 | Stop reason: HOSPADM

## 2025-08-06 RX ORDER — CYCLOPENTOLATE HYDROCHLORIDE 10 MG/ML
1 SOLUTION/ DROPS OPHTHALMIC
Status: COMPLETED | OUTPATIENT
Start: 2025-08-06 | End: 2025-08-06

## 2025-08-06 RX ORDER — MOXIFLOXACIN 5 MG/ML
1 SOLUTION/ DROPS OPHTHALMIC
Status: COMPLETED | OUTPATIENT
Start: 2025-08-06 | End: 2025-08-06

## 2025-08-06 RX ORDER — MOXIFLOXACIN 5 MG/ML
SOLUTION/ DROPS OPHTHALMIC AS NEEDED
Status: DISCONTINUED | OUTPATIENT
Start: 2025-08-06 | End: 2025-08-06 | Stop reason: HOSPADM

## 2025-08-06 RX ORDER — TETRACAINE HYDROCHLORIDE 5 MG/ML
SOLUTION OPHTHALMIC AS NEEDED
Status: DISCONTINUED | OUTPATIENT
Start: 2025-08-06 | End: 2025-08-06 | Stop reason: HOSPADM

## 2025-08-06 RX ORDER — HYDROMORPHONE HYDROCHLORIDE 1 MG/ML
0.5 INJECTION, SOLUTION INTRAMUSCULAR; INTRAVENOUS; SUBCUTANEOUS EVERY 5 MIN PRN
Status: DISCONTINUED | OUTPATIENT
Start: 2025-08-06 | End: 2025-08-06 | Stop reason: HOSPADM

## 2025-08-06 RX ORDER — OXYCODONE HYDROCHLORIDE 5 MG/1
10 TABLET ORAL EVERY 4 HOURS PRN
Status: DISCONTINUED | OUTPATIENT
Start: 2025-08-06 | End: 2025-08-06 | Stop reason: HOSPADM

## 2025-08-06 RX ORDER — ONDANSETRON HYDROCHLORIDE 2 MG/ML
4 INJECTION, SOLUTION INTRAVENOUS ONCE AS NEEDED
Status: DISCONTINUED | OUTPATIENT
Start: 2025-08-06 | End: 2025-08-06 | Stop reason: HOSPADM

## 2025-08-06 RX ORDER — PHENYLEPHRINE HYDROCHLORIDE 25 MG/ML
1 SOLUTION/ DROPS OPHTHALMIC
Status: COMPLETED | OUTPATIENT
Start: 2025-08-06 | End: 2025-08-06

## 2025-08-06 RX ORDER — WATER 1 ML/ML
INJECTION IRRIGATION AS NEEDED
Status: DISCONTINUED | OUTPATIENT
Start: 2025-08-06 | End: 2025-08-06 | Stop reason: HOSPADM

## 2025-08-06 RX ORDER — PREDNISOLONE ACETATE 10 MG/ML
1 SUSPENSION/ DROPS OPHTHALMIC 4 TIMES DAILY
Qty: 5 ML | Refills: 1 | Status: SHIPPED | OUTPATIENT
Start: 2025-08-06 | End: 2025-09-05

## 2025-08-06 RX ORDER — ONDANSETRON HYDROCHLORIDE 2 MG/ML
INJECTION, SOLUTION INTRAVENOUS AS NEEDED
Status: DISCONTINUED | OUTPATIENT
Start: 2025-08-06 | End: 2025-08-06

## 2025-08-06 RX ORDER — ALBUTEROL SULFATE 0.83 MG/ML
2.5 SOLUTION RESPIRATORY (INHALATION) ONCE AS NEEDED
Status: DISCONTINUED | OUTPATIENT
Start: 2025-08-06 | End: 2025-08-06 | Stop reason: HOSPADM

## 2025-08-06 RX ORDER — SODIUM CHLORIDE, SODIUM LACTATE, POTASSIUM CHLORIDE, CALCIUM CHLORIDE 600; 310; 30; 20 MG/100ML; MG/100ML; MG/100ML; MG/100ML
100 INJECTION, SOLUTION INTRAVENOUS CONTINUOUS
Status: DISCONTINUED | OUTPATIENT
Start: 2025-08-06 | End: 2025-08-06 | Stop reason: HOSPADM

## 2025-08-06 RX ORDER — LABETALOL HYDROCHLORIDE 5 MG/ML
5 INJECTION, SOLUTION INTRAVENOUS ONCE AS NEEDED
Status: DISCONTINUED | OUTPATIENT
Start: 2025-08-06 | End: 2025-08-06 | Stop reason: HOSPADM

## 2025-08-06 RX ORDER — PROCHLORPERAZINE EDISYLATE 5 MG/ML
5 INJECTION INTRAMUSCULAR; INTRAVENOUS ONCE AS NEEDED
Status: DISCONTINUED | OUTPATIENT
Start: 2025-08-06 | End: 2025-08-06 | Stop reason: HOSPADM

## 2025-08-06 RX ADMIN — TROPICAMIDE 1 DROP: 10 SOLUTION/ DROPS OPHTHALMIC at 10:55

## 2025-08-06 RX ADMIN — TETRACAINE HYDROCHLORIDE 1 DROP: 5 SOLUTION OPHTHALMIC at 10:50

## 2025-08-06 RX ADMIN — PHENYLEPHRINE HYDROCHLORIDE 1 DROP: 25 SOLUTION/ DROPS OPHTHALMIC at 11:00

## 2025-08-06 RX ADMIN — CYCLOPENTOLATE HYDROCHLORIDE 1 DROP: 10 SOLUTION/ DROPS OPHTHALMIC at 11:00

## 2025-08-06 RX ADMIN — TROPICAMIDE 1 DROP: 10 SOLUTION/ DROPS OPHTHALMIC at 11:00

## 2025-08-06 RX ADMIN — PHENYLEPHRINE HYDROCHLORIDE 1 DROP: 25 SOLUTION/ DROPS OPHTHALMIC at 10:55

## 2025-08-06 RX ADMIN — MOXIFLOXACIN HYDROCHLORIDE 1 DROP: 5 SOLUTION/ DROPS OPHTHALMIC at 11:00

## 2025-08-06 RX ADMIN — ONDANSETRON 4 MG: 2 INJECTION INTRAMUSCULAR; INTRAVENOUS at 11:48

## 2025-08-06 RX ADMIN — CYCLOPENTOLATE HYDROCHLORIDE 1 DROP: 10 SOLUTION/ DROPS OPHTHALMIC at 10:50

## 2025-08-06 RX ADMIN — MOXIFLOXACIN HYDROCHLORIDE 1 DROP: 5 SOLUTION/ DROPS OPHTHALMIC at 10:50

## 2025-08-06 RX ADMIN — CYCLOPENTOLATE HYDROCHLORIDE 1 DROP: 10 SOLUTION/ DROPS OPHTHALMIC at 10:55

## 2025-08-06 RX ADMIN — MOXIFLOXACIN HYDROCHLORIDE 1 DROP: 5 SOLUTION/ DROPS OPHTHALMIC at 10:55

## 2025-08-06 RX ADMIN — TROPICAMIDE 1 DROP: 10 SOLUTION/ DROPS OPHTHALMIC at 10:50

## 2025-08-06 RX ADMIN — PHENYLEPHRINE HYDROCHLORIDE 1 DROP: 25 SOLUTION/ DROPS OPHTHALMIC at 10:50

## 2025-08-06 SDOH — HEALTH STABILITY: MENTAL HEALTH: CURRENT SMOKER: 0

## 2025-08-06 ASSESSMENT — PAIN - FUNCTIONAL ASSESSMENT
PAIN_FUNCTIONAL_ASSESSMENT: 0-10
PAIN_FUNCTIONAL_ASSESSMENT: 0-10

## 2025-08-06 ASSESSMENT — PAIN SCALES - GENERAL
PAINLEVEL_OUTOF10: 0 - NO PAIN
PAIN_LEVEL: 0
PAINLEVEL_OUTOF10: 6

## 2025-08-06 ASSESSMENT — COLUMBIA-SUICIDE SEVERITY RATING SCALE - C-SSRS
2. HAVE YOU ACTUALLY HAD ANY THOUGHTS OF KILLING YOURSELF?: NO
1. IN THE PAST MONTH, HAVE YOU WISHED YOU WERE DEAD OR WISHED YOU COULD GO TO SLEEP AND NOT WAKE UP?: NO
6. HAVE YOU EVER DONE ANYTHING, STARTED TO DO ANYTHING, OR PREPARED TO DO ANYTHING TO END YOUR LIFE?: NO

## 2025-08-06 NOTE — ANESTHESIA PREPROCEDURE EVALUATION
Patient: Tami Faustin    Procedure Information       Date/Time: 08/06/25 1218    Procedure: Intraocular lens exchange OS (Left)    Location: [facility] OR 02 / [facility]    Surgeons: Jcarlos Rojas MD            Relevant Problems   Cardiac   (+) Mixed hyperlipidemia      Neuro   (+) Amaurosis fugax of left eye      GI   (+) GERD (gastroesophageal reflux disease)      Endocrine   (+) Class 3 severe obesity due to excess calories with serious comorbidity and body mass index (BMI) of 40.0 to 44.9 in adult      Musculoskeletal   (+) Generalized osteoarthritis of multiple sites      HEENT   (+) 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       Clinical information reviewed:   Tobacco  Allergies  Meds   Med Hx  Surg Hx  OB Status  Fam Hx  Soc   Hx        NPO Detail:  NPO/Void Status  Date of Last Liquid: 08/06/25  Time of Last Liquid: 0730  Date of Last Solid: 08/05/25  Time of Last Solid: 2130  Last Intake Type: Clear fluids  Time of Last Void: 1000         Physical Exam    Airway  Mallampati: II  TM distance: >3 FB  Neck ROM: full     Cardiovascular   Rhythm: regular  Rate: normal     Dental - normal exam     Pulmonary Breath sounds clear to auscultation     Abdominal            Anesthesia Plan    History of general anesthesia?: yes  History of complications of general anesthesia?: yes    ASA 2     MAC   (Hx of PONV, will administer ondansetron. )  The patient is not a current smoker.    intravenous induction   Anesthetic plan and risks discussed with patient.    Plan discussed with CRNA.

## 2025-08-06 NOTE — DISCHARGE INSTRUCTIONS
Start the following eye drops in the operative eye:     Prednisolone drops:   4 times/day     Keep eye shield on for 24 hours except to put in drops.   Sleep with shield at night for 7 days  No eye rubbing  May shower and wash face but no water inside surgery eye  No lifting any weight above 10lbs  Patient to resume home medications.   Please call immediately if you develop any redness, pain, decreased vision, flashes, floaters.   Office phone (after hours): [phone]   Hospital : [phone] (call this number if unable to reach someone on the phone above) then ask for ophthalmologist on call.     Your follow-up appointment is tomorrow at [facility] at 10:45 AM.

## 2025-08-06 NOTE — BRIEF OP NOTE
Date: 2025  OR Location: [facility] OR    Name: Tami Faustin, : 1960, Age: 64 y.o., MRN: [id number], Sex: female    Diagnosis  Pre-op Diagnosis      * Mechanical complication due to intraocular lens implant, subsequent encounter [T85.29XD] Post-op Diagnosis     * Mechanical complication due to intraocular lens implant, subsequent encounter [T85.29XD]     Procedures  Intraocular lens exchange OS  41010 - [place] EXCHANGE INTRAOCULAR LENS      Surgeons      * Jcarlos Rojas - Primary    Resident/Fellow/Other Assistant:  Surgeons and Role:  * No surgeons found with a matching role *    Staff:   Circulator: Shannon Westub Person: Pallavi Oviedo Circulator: Radha Oviedo Scrub: Taylor    Anesthesia Staff: Anesthesiologist: Jackie Reinoso MD  CRNA: JAYCOB Morris-VALDO    Procedure Summary  Anesthesia: Monitor Anesthesia Care  ASA: II  Estimated Blood Loss: 0 mL  Intra-op Medications:   Administrations occurring from 1218 to 1258 on 25:   Medication Name Total Dose   sodium hyaluronate (Provisc) intraocular injection 0.55 mg   triamcinolone acetonide (Kenalog-40) injection 20 mg   tetracaine (PF) 0.5 % ophthalmic solution 2 drop   chondroitin sulf-sod hyaluron (Viscoat) intraocular injection 0.5 mL              Anesthesia Record               Intraprocedure I/O Totals       None           Specimen: No specimens collected               Findings: Adherent IOL, left in place    Complications:  None; patient tolerated the procedure well.     Disposition: PACU - hemodynamically stable.  Condition: stable  Specimens Collected: No specimens collected  Attending Attestation: I performed the procedure.    Jcarlos Rojas  Phone Number: [phone]

## 2025-08-06 NOTE — H&P
"History Of Present Illness  Tami Faustin is a 64 y.o. female presenting with left eye monocular diplopia.     Past Medical History  Medical History[1]    Surgical History  Surgical History[2]     Social History  She reports that she has never smoked. She has never used smokeless tobacco. She reports current alcohol use. She reports that she does not use drugs.    Family History  Family History[3]     Allergies  Atorvastatin, Bee pollen, Bee venom protein (honey bee), and Penicillins    Review of Systems  No new flashes, floaters, eye pain, sudden vision loss, double vision, redness, or drainage.       Physical Exam  HENT:      Head: Normocephalic and atraumatic.   Cardiovascular:      Pulses: Warm and well perfused  Pulmonary:      Effort: Pulmonary effort is normal.   Abdominal:      General: Abdomen is flat.      Palpations: Abdomen is soft.   Musculoskeletal:         General: No deformity.   Skin:     General: Skin is warm and dry.   Neurological:      General: No focal deficit present.      Mental Status: He is alert and oriented to person, place, and time.   Psychiatric:         Mood and Affect: Mood normal.      Last Recorded Vitals  Height 1.732 m (5' 8.19\"), weight 120 kg (264 lb 8.8 oz).    Relevant Results      No current facility-administered medications on file prior to encounter.     Current Outpatient Medications on File Prior to Encounter   Medication Sig Dispense Refill    aspirin 81 mg EC tablet Take 1 tablet (81 mg) by mouth once daily. (Patient taking differently: Take 1 tablet (81 mg) by mouth if needed.)      carboxymethylcellulose (Refresh Plus) 0.5 % ophthalmic solution Administer 1 drop into both eyes 2 times a day as needed for dry eyes.      cholecalciferol (Vitamin D3) 25 mcg (1,000 units) tablet Take 1 tablet (25 mcg) by mouth once daily.      cyanocobalamin (Vitamin B-12) 250 mcg tablet Take 1 tablet (250 mcg) by mouth once daily.      diphenhydrAMINE (Sominex) 25 mg tablet Take 1 " tablet (25 mg) by mouth once daily as needed for allergies.      ezetimibe (Zetia) 10 mg tablet Take 1 tablet (10 mg) by mouth once daily. 90 tablet 3    ferrous sulfate 325 (65 Fe) mg EC tablet Take 1 tablet by mouth 3 times daily (morning, midday, late afternoon). Do not crush, chew, or split.      metFORMIN  mg 24 hr tablet Take 2 tablets (1,000 mg) by mouth once daily at bedtime.      multivitamin tablet Take 1 tablet by mouth once daily.      omega-3 acid ethyl esters (Lovaza) 1 gram capsule Take 1 capsule (1 g) by mouth 2 times a day.      omeprazole (PriLOSEC) 40 mg DR capsule Take 1 tablet by mouth once daily.      tolterodine LA (Detrol LA) 4 mg 24 hr capsule Take 1 capsule (4 mg) by mouth once daily. PRN (Patient not taking: Reported on 6/26/2025)            Assessment & Plan      Plan:   - Left eye intraocular lens exchange        Juliann Snowden MD         [1]   Past Medical History:  Diagnosis Date    Arthritis     Calcaneal spur, unspecified foot     Heel spur    Cataract     Consumes two servings of caffeine per day     Diabetic neuropathy (Multi)     Endothelial corneal dystrophy, unspecified eye 05/07/2018    Corneal guttata    GERD (gastroesophageal reflux disease)     Hyperlipidemia     Migraine headache     SHAMIR (obstructive sleep apnea)     PONV (postoperative nausea and vomiting)     Seasonal allergies     Spinal stenosis, lumbar region, without neurogenic claudication 11/28/2006    Unspecified lump in unspecified breast     Breast nodule    Wears glasses    [2]   Past Surgical History:  Procedure Laterality Date    ACHILLES TENDON SURGERY Left 02/16/2024    BREAST BIOPSY Right 1992    benign x2    BREAST CYST EXCISION      CATARACT EXTRACTION, BILATERAL      8/2024 and 9/2024    COLONOSCOPY      GLAUCOMA SURGERY Bilateral     by Dr. Juan Soto    PLANTAR FASCIA RELEASE Right 1999    TOTAL KNEE ARTHROPLASTY Left     partial 2011  and right total 2018    WISDOM TOOTH EXTRACTION     [3]    Family History  Problem Relation Name Age of Onset    Hypertension Mother      Diabetes Mother      COPD Mother      Heart failure Mother      Hypertension Father      COPD Father      Emphysema Father      Cataracts Father      Cancer Maternal Grandmother      Diabetes Maternal Grandmother      Macular degeneration Maternal Grandmother

## 2025-08-06 NOTE — ANESTHESIA POSTPROCEDURE EVALUATION
Patient: Tami Faustin    Procedure Summary       Date: 08/06/25 Room / Location: [facility] OR 02 / [facility]    Anesthesia Start: 1154 Anesthesia Stop: 1241    Procedure: Intraocular lens exchange OS (Left: Eye) Diagnosis:       Mechanical complication due to intraocular lens implant, subsequent encounter      (Mechanical complication due to intraocular lens implant, subsequent encounter [T85.29XD])    Surgeons: Jcarlos Rojas MD Responsible Provider: Jackie Reinoso MD    Anesthesia Type: MAC ASA Status: 2            Anesthesia Type: MAC    Vitals Value Taken Time   /82 08/06/25 12:45   Temp 36.6 08/06/25 12:45   Pulse 74 08/06/25 12:45   Resp 16 08/06/25 12:45   SpO2 97 08/06/25 12:45       Anesthesia Post Evaluation    Patient location during evaluation: PACU  Patient participation: complete - patient participated  Level of consciousness: awake and awake and alert  Pain score: 0  Pain management: adequate  Airway patency: patent  Cardiovascular status: acceptable and stable  Respiratory status: acceptable  Hydration status: acceptable  Postoperative Nausea and Vomiting: none        No notable events documented.

## 2025-08-07 ENCOUNTER — APPOINTMENT (OUTPATIENT)
Dept: OPHTHALMOLOGY | Facility: CLINIC | Age: 65
End: 2025-08-07
Payer: MEDICARE

## 2025-08-07 DIAGNOSIS — Z96.1 PSEUDOPHAKIA OF BOTH EYES: Primary | ICD-10-CM

## 2025-08-07 PROCEDURE — 99024 POSTOP FOLLOW-UP VISIT: CPT | Performed by: OPHTHALMOLOGY

## 2025-08-07 ASSESSMENT — ENCOUNTER SYMPTOMS
ALLERGIC/IMMUNOLOGIC NEGATIVE: 0
GASTROINTESTINAL NEGATIVE: 0
CONSTITUTIONAL NEGATIVE: 0
HEMATOLOGIC/LYMPHATIC NEGATIVE: 0
NEUROLOGICAL NEGATIVE: 0
CARDIOVASCULAR NEGATIVE: 0
PSYCHIATRIC NEGATIVE: 0
RESPIRATORY NEGATIVE: 0
EYES NEGATIVE: 1
MUSCULOSKELETAL NEGATIVE: 0
ENDOCRINE NEGATIVE: 0

## 2025-08-07 ASSESSMENT — EXTERNAL EXAM - RIGHT EYE: OD_EXAM: NORMAL

## 2025-08-07 ASSESSMENT — TONOMETRY
IOP_METHOD: GOLDMANN APPLANATION
OS_IOP_MMHG: 18

## 2025-08-07 ASSESSMENT — PACHYMETRY
OS_CT(UM): 550
OD_CT(UM): 572

## 2025-08-07 ASSESSMENT — VISUAL ACUITY
METHOD: SNELLEN - LINEAR
OS_SC: 20/50

## 2025-08-07 ASSESSMENT — SLIT LAMP EXAM - LIDS
COMMENTS: NORMAL
COMMENTS: NORMAL

## 2025-08-07 ASSESSMENT — EXTERNAL EXAM - LEFT EYE: OS_EXAM: NORMAL

## 2025-08-07 NOTE — OP NOTE
Intraocular lens exchange OS (L) Operative Note     Date: 2025  OR Location: [facility] OR    Name: Tami Faustin, : 1960, Age: 64 y.o., MRN: [id number], Sex: female    Diagnosis  Pre-op Diagnosis      * Mechanical complication due to intraocular lens implant, subsequent encounter [T85.29XD] Post-op Diagnosis     * Mechanical complication due to intraocular lens implant, subsequent encounter [T85.29XD]     Procedures  Intraocular lens exchange OS  74871 - [place] EXCHANGE INTRAOCULAR LENS      Surgeons      * Jcarlos Rojas - Primary    Resident/Fellow/Other Assistant:  Surgeons and Role:  * No surgeons found with a matching role *    Staff:   Circulator: Shannon Isaac Person: Pallavi Oviedo Circulator: Radha Oviedo Scrub: Taylor    Anesthesia Staff: Anesthesiologist: Jackie Reinoso MD  CRNA: JAYCOB Morris-VALDO    Procedure Summary  Anesthesia: Monitor Anesthesia Care  ASA: II  Estimated Blood Loss: 0mL  Intra-op Medications:   Administrations occurring from 1218 to 1258 on 25:   Medication Name Total Dose   sodium hyaluronate (Provisc) intraocular injection 0.55 mg   triamcinolone acetonide (Kenalog-40) injection 20 mg   tetracaine (PF) 0.5 % ophthalmic solution 2 drop   chondroitin sulf-sod hyaluron (Viscoat) intraocular injection 0.5 mL              Anesthesia Record               Intraprocedure I/O Totals       None           Specimen: No specimens collected              Drains and/or Catheters: * None in log *    Tourniquet Times:         Implants:     Findings: Apthera IOL with both haptics strongly adherent to the capsular bag    Indications: Tami Faustin is an 64 y.o. female who is having surgery for Mechanical complication due to intraocular lens implant, subsequent encounter [T85.29XD].     The patient was seen in the preoperative area. The risks, benefits, complications, treatment options, non-operative alternatives, expected recovery and outcomes were discussed with the  patient. The possibilities of reaction to medication, pulmonary aspiration, injury to surrounding structures, bleeding, recurrent infection, the need for additional procedures, failure to diagnose a condition, and creating a complication requiring transfusion or operation were discussed with the patient. The patient concurred with the proposed plan, giving informed consent.  The site of surgery was properly noted/marked if necessary per policy. The patient has been actively warmed in preoperative area. Preoperative antibiotics have been ordered and given within 1 hours of incision. Venous thrombosis prophylaxis are not indicated.    Procedure Details: The patient was placed in the supine position on the operating room table where appropriate blood pressure and cardiac monitoring were initiated. The patient was prepped and draped in the usual sterile fashion for intraocular surgery. This included instillation of Betadine 5% onto the ocular surface followed by irrigation with balance salt solution a minute or two later. A lid speculum was placed and the operating microscope was positioned. Two paracentesis stab incisions were made to the left and right of the planned incision with a 15-degree supersharp blade. 1 ml of preservative free lidocaine was injected into the AC. Viscoat was used to replace the aqueous humor. The cataract surgery incision was reopened using a sinsky hook. Provisc was used to separate the IOL from the capsular bag. Despite multiple attempts at freeing the haptics of the IOL, it was not possible to completely free them from the capsular bag without ripping the zonules. A decision was taken to abort the IOL exchange. Residual viscoelastic was removed from the eye with the irrigation/aspiration instrument. Preservative free moxifloxcain was injected intracameral. The wounds were checked and found to be watertight. The lid speculum was removed and the eye was dressed with Maxitrol ointment, eye  pad, tape, and shield. The patient tolerated the procedure well, and there were no complications.    Evidence of Infection: No   Complications:  None; patient tolerated the procedure well.    Disposition: PACU - hemodynamically stable.  Condition: stable                 Additional Details: None    Attending Attestation: I performed the procedure.    Jcarlos Rojas  Phone Number: [phone]

## 2025-08-07 NOTE — PROGRESS NOTES
POD 1 attempted IOLX OS. Surgery aborted due to haptics being very stuck to the capsular bag   Will refer to Dr. Charles for IOLX with SFIOL  GONZALO vences  Discussed with pt and  possible corneal decompensation and need for future EK

## 2025-08-13 ENCOUNTER — APPOINTMENT (OUTPATIENT)
Dept: OPHTHALMOLOGY | Facility: CLINIC | Age: 65
End: 2025-08-13
Payer: MEDICARE

## 2025-08-13 DIAGNOSIS — D31.32 NEVUS OF CHOROID OF LEFT EYE: Primary | ICD-10-CM

## 2025-08-13 DIAGNOSIS — H43.21 ASTEROID HYALITIS OF RIGHT EYE: ICD-10-CM

## 2025-08-13 DIAGNOSIS — T85.29XD MECHANICAL COMPLICATION DUE TO INTRAOCULAR LENS IMPLANT, SUBSEQUENT ENCOUNTER: ICD-10-CM

## 2025-08-13 DIAGNOSIS — H27.112 LENS DISLOCATION AND SUBLUXATION, LEFT: ICD-10-CM

## 2025-08-13 PROCEDURE — 99214 OFFICE O/P EST MOD 30 MIN: CPT

## 2025-08-13 PROCEDURE — 92134 CPTRZ OPH DX IMG PST SGM RTA: CPT

## 2025-08-13 RX ORDER — NAPROXEN 250 MG/1
250 TABLET ORAL
COMMUNITY

## 2025-08-13 RX ORDER — IBUPROFEN 100 MG/1
TABLET, CHEWABLE ORAL EVERY 8 HOURS PRN
COMMUNITY

## 2025-08-13 ASSESSMENT — SLIT LAMP EXAM - LIDS
COMMENTS: NORMAL
COMMENTS: NORMAL

## 2025-08-13 ASSESSMENT — ENCOUNTER SYMPTOMS
RESPIRATORY NEGATIVE: 0
CARDIOVASCULAR NEGATIVE: 0
MUSCULOSKELETAL NEGATIVE: 0
NEUROLOGICAL NEGATIVE: 0
ENDOCRINE NEGATIVE: 0
CONSTITUTIONAL NEGATIVE: 0
EYES NEGATIVE: 1
GASTROINTESTINAL NEGATIVE: 0
HEMATOLOGIC/LYMPHATIC NEGATIVE: 0
PSYCHIATRIC NEGATIVE: 0
ALLERGIC/IMMUNOLOGIC NEGATIVE: 0

## 2025-08-13 ASSESSMENT — VISUAL ACUITY
OD_SC: 20/40
METHOD: SNELLEN - LINEAR
OD_SC+: +1
OS_SC+: -2
OS_SC: 20/40

## 2025-08-13 ASSESSMENT — TONOMETRY
OD_IOP_MMHG: 12
IOP_METHOD: GOLDMANN APPLANATION
OS_IOP_MMHG: 18

## 2025-08-13 ASSESSMENT — CUP TO DISC RATIO
OS_RATIO: .4
OD_RATIO: .4

## 2025-08-13 ASSESSMENT — PACHYMETRY
OS_CT(UM): 550
OD_CT(UM): 572

## 2025-08-13 ASSESSMENT — EXTERNAL EXAM - LEFT EYE: OS_EXAM: NORMAL

## 2025-08-13 ASSESSMENT — EXTERNAL EXAM - RIGHT EYE: OD_EXAM: NORMAL

## 2025-08-20 ENCOUNTER — PREP FOR PROCEDURE (OUTPATIENT)
Dept: OPHTHALMOLOGY | Facility: CLINIC | Age: 65
End: 2025-08-20
Payer: MEDICARE

## 2025-08-20 ENCOUNTER — HOSPITAL ENCOUNTER (OUTPATIENT)
Facility: CLINIC | Age: 65
Setting detail: OUTPATIENT SURGERY
End: 2025-08-20
Payer: MEDICARE

## 2025-08-20 DIAGNOSIS — H27.112 LENS SUBLUXATION, LEFT: Primary | ICD-10-CM

## 2025-08-20 RX ORDER — TROPICAMIDE 10 MG/ML
1 SOLUTION/ DROPS OPHTHALMIC
OUTPATIENT
Start: 2025-08-21 | End: 2025-08-21

## 2025-08-20 RX ORDER — PHENYLEPHRINE HYDROCHLORIDE 25 MG/ML
1 SOLUTION/ DROPS OPHTHALMIC
OUTPATIENT
Start: 2025-08-21 | End: 2025-08-21

## 2025-08-20 RX ORDER — PROPARACAINE HYDROCHLORIDE 5 MG/ML
1 SOLUTION/ DROPS OPHTHALMIC ONCE
OUTPATIENT
Start: 2025-08-21 | End: 2025-08-21

## 2025-08-25 ENCOUNTER — EVALUATION (OUTPATIENT)
Dept: PHYSICAL THERAPY | Facility: CLINIC | Age: 65
End: 2025-08-25
Payer: MEDICARE

## 2025-08-25 DIAGNOSIS — M25.552 LEFT HIP PAIN: ICD-10-CM

## 2025-08-25 DIAGNOSIS — M54.16 RADICULOPATHY, LUMBAR REGION: Primary | ICD-10-CM

## 2025-08-25 PROCEDURE — 97110 THERAPEUTIC EXERCISES: CPT | Mod: GP | Performed by: PHYSICAL THERAPIST

## 2025-08-25 PROCEDURE — 97161 PT EVAL LOW COMPLEX 20 MIN: CPT | Mod: GP | Performed by: PHYSICAL THERAPIST

## 2025-08-27 ENCOUNTER — TREATMENT (OUTPATIENT)
Dept: PHYSICAL THERAPY | Facility: CLINIC | Age: 65
End: 2025-08-27
Payer: MEDICARE

## 2025-08-27 DIAGNOSIS — M25.552 LEFT HIP PAIN: ICD-10-CM

## 2025-08-27 DIAGNOSIS — M54.16 RADICULOPATHY, LUMBAR REGION: ICD-10-CM

## 2025-08-27 PROCEDURE — 97110 THERAPEUTIC EXERCISES: CPT | Mod: GP,CQ

## 2025-09-02 ENCOUNTER — DOCUMENTATION (OUTPATIENT)
Dept: OPHTHALMOLOGY | Facility: HOSPITAL | Age: 65
End: 2025-09-02
Payer: MEDICARE

## 2025-09-04 ENCOUNTER — TREATMENT (OUTPATIENT)
Dept: PHYSICAL THERAPY | Facility: CLINIC | Age: 65
End: 2025-09-04
Payer: MEDICARE

## 2025-09-04 DIAGNOSIS — M25.552 LEFT HIP PAIN: ICD-10-CM

## 2025-09-04 DIAGNOSIS — M54.16 RADICULOPATHY, LUMBAR REGION: ICD-10-CM

## 2025-09-04 PROCEDURE — 97110 THERAPEUTIC EXERCISES: CPT | Mod: GP,CQ

## 2025-09-09 ENCOUNTER — APPOINTMENT (OUTPATIENT)
Dept: OPHTHALMOLOGY | Facility: CLINIC | Age: 65
End: 2025-09-09
Payer: MEDICARE

## 2025-11-13 ENCOUNTER — APPOINTMENT (OUTPATIENT)
Dept: CARDIOLOGY | Facility: CLINIC | Age: 65
End: 2025-11-13
Payer: MEDICARE

## (undated) DEVICE — GLOVE, SURGICAL, PROTEXIS PI , 8.0, PF, LF

## (undated) DEVICE — NEEDLE, HYPODERMIC, REGULAR WALL, REGULAR BEVEL, 18 G X 1.5 IN

## (undated) DEVICE — GOWN, ASTOUND, XL

## (undated) DEVICE — SYRINGE, 1 CC, LUER LOCK

## (undated) DEVICE — NEEDLE, FILTER 19 G X 1 IN

## (undated) DEVICE — DRAPE, UTILITY, INSTRUMENT PANEL, 46CM X 56CM (18X22"

## (undated) DEVICE — HANDPIECE,  IRRIGATION/ASPIRATION, 45DEG, 2.2MM-2.8MM, BLUE

## (undated) DEVICE — Device

## (undated) DEVICE — SYRINGE, 10 CC, LUER LOCK

## (undated) DEVICE — KNIFE, CLEARCUT HP2, DUAL BEVEL, SLIT, 2.4MM ANGLED

## (undated) DEVICE — NEEDLE, HYPODERMIC, REGULAR WALL, REGULAR BEVEL, 30 G X 0.5 IN

## (undated) DEVICE — NEEDLE, HYDRODISSECTION 25G 11MM BEND

## (undated) DEVICE — CANNULA, HYDRODISSECTION, MICRO, 25 G X 8 MM

## (undated) DEVICE — DRESSING, TRANSPARENT, TEGADERM, 2-3/8 X 2-3/4 IN

## (undated) DEVICE — CANNULA, ANTERIOR CHAMBER, 27 GA

## (undated) DEVICE — RETRACTOR, IRIS, FLEXIBLE